# Patient Record
Sex: FEMALE | Race: WHITE | Employment: FULL TIME | URBAN - METROPOLITAN AREA
[De-identification: names, ages, dates, MRNs, and addresses within clinical notes are randomized per-mention and may not be internally consistent; named-entity substitution may affect disease eponyms.]

---

## 2018-03-14 ENCOUNTER — HOSPITAL ENCOUNTER (OUTPATIENT)
Dept: GENERAL RADIOLOGY | Age: 58
Discharge: HOME | End: 2018-03-14
Payer: COMMERCIAL

## 2018-03-14 ENCOUNTER — HOSPITAL ENCOUNTER (OUTPATIENT)
Dept: PREADMISSION TESTING | Age: 58
Discharge: HOME | End: 2018-03-14
Payer: COMMERCIAL

## 2018-03-14 DIAGNOSIS — M16.12 PRIMARY OSTEOARTHRITIS OF LEFT HIP: ICD-10-CM

## 2018-03-14 LAB
ABO + RH BLD: NORMAL
ALBUMIN SERPL-MCNC: 3.8 G/DL (ref 3.4–5)
ALBUMIN/GLOB SERPL: 1.1 {RATIO} (ref 0.8–1.7)
ALP SERPL-CCNC: 104 U/L (ref 45–117)
ALT SERPL-CCNC: 46 U/L (ref 13–56)
ANION GAP SERPL CALC-SCNC: 10 MMOL/L (ref 3–18)
APPEARANCE UR: CLEAR
APTT PPP: 27.6 SEC (ref 23–36.4)
AST SERPL-CCNC: 22 U/L (ref 15–37)
ATRIAL RATE: 81 BPM
BACTERIA SPEC CULT: NORMAL
BACTERIA URNS QL MICRO: ABNORMAL /HPF
BASOPHILS # BLD: 0 K/UL (ref 0–0.06)
BASOPHILS NFR BLD: 0 % (ref 0–2)
BILIRUB SERPL-MCNC: 0.5 MG/DL (ref 0.2–1)
BILIRUB UR QL: NEGATIVE
BLOOD GROUP ANTIBODIES SERPL: NORMAL
BUN SERPL-MCNC: 16 MG/DL (ref 7–18)
BUN/CREAT SERPL: 20 (ref 12–20)
CALCIUM SERPL-MCNC: 8.9 MG/DL (ref 8.5–10.1)
CALCULATED P AXIS, ECG09: 60 DEGREES
CALCULATED R AXIS, ECG10: 31 DEGREES
CALCULATED T AXIS, ECG11: 56 DEGREES
CHLORIDE SERPL-SCNC: 104 MMOL/L (ref 100–108)
CO2 SERPL-SCNC: 29 MMOL/L (ref 21–32)
COLOR UR: YELLOW
CREAT SERPL-MCNC: 0.79 MG/DL (ref 0.6–1.3)
DIAGNOSIS, 93000: NORMAL
DIFFERENTIAL METHOD BLD: NORMAL
EOSINOPHIL # BLD: 0.1 K/UL (ref 0–0.4)
EOSINOPHIL NFR BLD: 2 % (ref 0–5)
EPITH CASTS URNS QL MICRO: ABNORMAL /LPF (ref 0–5)
ERYTHROCYTE [DISTWIDTH] IN BLOOD BY AUTOMATED COUNT: 13.5 % (ref 11.6–14.5)
GLOBULIN SER CALC-MCNC: 3.4 G/DL (ref 2–4)
GLUCOSE SERPL-MCNC: 96 MG/DL (ref 74–99)
GLUCOSE UR STRIP.AUTO-MCNC: NEGATIVE MG/DL
HCT VFR BLD AUTO: 43.2 % (ref 35–45)
HGB BLD-MCNC: 14.2 G/DL (ref 12–16)
HGB UR QL STRIP: ABNORMAL
INR PPP: 0.9 (ref 0.8–1.2)
KETONES UR QL STRIP.AUTO: NEGATIVE MG/DL
LEUKOCYTE ESTERASE UR QL STRIP.AUTO: NEGATIVE
LYMPHOCYTES # BLD: 1.9 K/UL (ref 0.9–3.6)
LYMPHOCYTES NFR BLD: 31 % (ref 21–52)
MCH RBC QN AUTO: 29.5 PG (ref 24–34)
MCHC RBC AUTO-ENTMCNC: 32.9 G/DL (ref 31–37)
MCV RBC AUTO: 89.8 FL (ref 74–97)
MONOCYTES # BLD: 0.6 K/UL (ref 0.05–1.2)
MONOCYTES NFR BLD: 10 % (ref 3–10)
NEUTS SEG # BLD: 3.5 K/UL (ref 1.8–8)
NEUTS SEG NFR BLD: 57 % (ref 40–73)
NITRITE UR QL STRIP.AUTO: NEGATIVE
P-R INTERVAL, ECG05: 176 MS
PH UR STRIP: 7 [PH] (ref 5–8)
PLATELET # BLD AUTO: 219 K/UL (ref 135–420)
PMV BLD AUTO: 9.8 FL (ref 9.2–11.8)
POTASSIUM SERPL-SCNC: 3.6 MMOL/L (ref 3.5–5.5)
PROT SERPL-MCNC: 7.2 G/DL (ref 6.4–8.2)
PROT UR STRIP-MCNC: NEGATIVE MG/DL
PROTHROMBIN TIME: 11.9 SEC (ref 11.5–15.2)
Q-T INTERVAL, ECG07: 388 MS
QRS DURATION, ECG06: 94 MS
QTC CALCULATION (BEZET), ECG08: 450 MS
RBC # BLD AUTO: 4.81 M/UL (ref 4.2–5.3)
RBC #/AREA URNS HPF: ABNORMAL /HPF (ref 0–5)
SERVICE CMNT-IMP: NORMAL
SODIUM SERPL-SCNC: 143 MMOL/L (ref 136–145)
SP GR UR REFRACTOMETRY: 1.02 (ref 1–1.03)
SPECIMEN EXP DATE BLD: NORMAL
UROBILINOGEN UR QL STRIP.AUTO: 1 EU/DL (ref 0.2–1)
VENTRICULAR RATE, ECG03: 81 BPM
WBC # BLD AUTO: 6.1 K/UL (ref 4.6–13.2)
WBC URNS QL MICRO: ABNORMAL /HPF (ref 0–5)

## 2018-03-14 PROCEDURE — 85610 PROTHROMBIN TIME: CPT | Performed by: ORTHOPAEDIC SURGERY

## 2018-03-14 PROCEDURE — 80053 COMPREHEN METABOLIC PANEL: CPT | Performed by: ORTHOPAEDIC SURGERY

## 2018-03-14 PROCEDURE — 36415 COLL VENOUS BLD VENIPUNCTURE: CPT | Performed by: ORTHOPAEDIC SURGERY

## 2018-03-14 PROCEDURE — 85025 COMPLETE CBC W/AUTO DIFF WBC: CPT | Performed by: ORTHOPAEDIC SURGERY

## 2018-03-14 PROCEDURE — 86850 RBC ANTIBODY SCREEN: CPT | Performed by: ORTHOPAEDIC SURGERY

## 2018-03-14 PROCEDURE — 71045 X-RAY EXAM CHEST 1 VIEW: CPT

## 2018-03-14 PROCEDURE — 81001 URINALYSIS AUTO W/SCOPE: CPT | Performed by: ORTHOPAEDIC SURGERY

## 2018-03-14 PROCEDURE — 87641 MR-STAPH DNA AMP PROBE: CPT | Performed by: ORTHOPAEDIC SURGERY

## 2018-03-14 PROCEDURE — 85730 THROMBOPLASTIN TIME PARTIAL: CPT | Performed by: ORTHOPAEDIC SURGERY

## 2018-03-14 PROCEDURE — 83036 HEMOGLOBIN GLYCOSYLATED A1C: CPT | Performed by: ORTHOPAEDIC SURGERY

## 2018-03-14 PROCEDURE — 93005 ELECTROCARDIOGRAM TRACING: CPT

## 2018-03-20 ENCOUNTER — ANESTHESIA EVENT (OUTPATIENT)
Dept: SURGERY | Age: 58
DRG: 470 | End: 2018-03-20
Payer: COMMERCIAL

## 2018-03-21 ENCOUNTER — APPOINTMENT (OUTPATIENT)
Dept: GENERAL RADIOLOGY | Age: 58
DRG: 470 | End: 2018-03-21
Attending: ORTHOPAEDIC SURGERY
Payer: COMMERCIAL

## 2018-03-21 ENCOUNTER — HOSPITAL ENCOUNTER (INPATIENT)
Age: 58
LOS: 3 days | Discharge: HOME HEALTH | DRG: 470 | End: 2018-03-24
Attending: ORTHOPAEDIC SURGERY | Admitting: ORTHOPAEDIC SURGERY
Payer: COMMERCIAL

## 2018-03-21 ENCOUNTER — APPOINTMENT (OUTPATIENT)
Dept: GENERAL RADIOLOGY | Age: 58
DRG: 470 | End: 2018-03-21
Attending: PHYSICIAN ASSISTANT
Payer: COMMERCIAL

## 2018-03-21 ENCOUNTER — ANESTHESIA (OUTPATIENT)
Dept: SURGERY | Age: 58
DRG: 470 | End: 2018-03-21
Payer: COMMERCIAL

## 2018-03-21 DIAGNOSIS — Z96.642 STATUS POST TOTAL REPLACEMENT OF LEFT HIP: Primary | ICD-10-CM

## 2018-03-21 PROBLEM — Z96.649 S/P TOTAL HIP ARTHROPLASTY: Status: ACTIVE | Noted: 2018-03-21

## 2018-03-21 PROCEDURE — 77030037875 HC DRSG MEPILEX <16IN BORD MOLN -A: Performed by: ORTHOPAEDIC SURGERY

## 2018-03-21 PROCEDURE — 74011250636 HC RX REV CODE- 250/636: Performed by: PHYSICIAN ASSISTANT

## 2018-03-21 PROCEDURE — 97116 GAIT TRAINING THERAPY: CPT

## 2018-03-21 PROCEDURE — 77030012407 HC DRN WND BARD -B: Performed by: ORTHOPAEDIC SURGERY

## 2018-03-21 PROCEDURE — 77030018836 HC SOL IRR NACL ICUM -A: Performed by: ORTHOPAEDIC SURGERY

## 2018-03-21 PROCEDURE — 77030011640 HC PAD GRND REM COVD -A: Performed by: ORTHOPAEDIC SURGERY

## 2018-03-21 PROCEDURE — 77030020262 HC SOL INJ SOD CL 0.9% 100ML: Performed by: ORTHOPAEDIC SURGERY

## 2018-03-21 PROCEDURE — 77030012891

## 2018-03-21 PROCEDURE — 74011250636 HC RX REV CODE- 250/636: Performed by: ORTHOPAEDIC SURGERY

## 2018-03-21 PROCEDURE — 76060000035 HC ANESTHESIA 2 TO 2.5 HR: Performed by: ORTHOPAEDIC SURGERY

## 2018-03-21 PROCEDURE — 65270000029 HC RM PRIVATE

## 2018-03-21 PROCEDURE — 74011000258 HC RX REV CODE- 258: Performed by: ORTHOPAEDIC SURGERY

## 2018-03-21 PROCEDURE — 88305 TISSUE EXAM BY PATHOLOGIST: CPT | Performed by: ORTHOPAEDIC SURGERY

## 2018-03-21 PROCEDURE — 77030002912 HC SUT ETHBND J&J -A: Performed by: ORTHOPAEDIC SURGERY

## 2018-03-21 PROCEDURE — 77030022295 HC SEAL BPLR VSL DISP MEDT -F: Performed by: ORTHOPAEDIC SURGERY

## 2018-03-21 PROCEDURE — 74011000258 HC RX REV CODE- 258

## 2018-03-21 PROCEDURE — 77030012508 HC MSK AIRWY LMA AMBU -A: Performed by: ANESTHESIOLOGY

## 2018-03-21 PROCEDURE — 77030032490 HC SLV COMPR SCD KNE COVD -B: Performed by: ORTHOPAEDIC SURGERY

## 2018-03-21 PROCEDURE — 77030031139 HC SUT VCRL2 J&J -A: Performed by: ORTHOPAEDIC SURGERY

## 2018-03-21 PROCEDURE — 77010033678 HC OXYGEN DAILY

## 2018-03-21 PROCEDURE — 77030002916 HC SUT ETHLN J&J -A: Performed by: ORTHOPAEDIC SURGERY

## 2018-03-21 PROCEDURE — 74011000250 HC RX REV CODE- 250

## 2018-03-21 PROCEDURE — 77030002933 HC SUT MCRYL J&J -A: Performed by: ORTHOPAEDIC SURGERY

## 2018-03-21 PROCEDURE — 77030038010: Performed by: ORTHOPAEDIC SURGERY

## 2018-03-21 PROCEDURE — 74011250637 HC RX REV CODE- 250/637: Performed by: PHYSICIAN ASSISTANT

## 2018-03-21 PROCEDURE — 77030011256 HC DRSG MEPILEX <16IN NO BORD MOLN -A: Performed by: ORTHOPAEDIC SURGERY

## 2018-03-21 PROCEDURE — 77030013567 HC DRN WND RESERV BARD -A: Performed by: ORTHOPAEDIC SURGERY

## 2018-03-21 PROCEDURE — 74011000250 HC RX REV CODE- 250: Performed by: ORTHOPAEDIC SURGERY

## 2018-03-21 PROCEDURE — 77030012406 HC DRN WND PENRS BARD -A: Performed by: ORTHOPAEDIC SURGERY

## 2018-03-21 PROCEDURE — 74011250636 HC RX REV CODE- 250/636

## 2018-03-21 PROCEDURE — 77030027138 HC INCENT SPIROMETER -A: Performed by: ORTHOPAEDIC SURGERY

## 2018-03-21 PROCEDURE — 76010000131 HC OR TIME 2 TO 2.5 HR: Performed by: ORTHOPAEDIC SURGERY

## 2018-03-21 PROCEDURE — 77030020782 HC GWN BAIR PAWS FLX 3M -B: Performed by: ORTHOPAEDIC SURGERY

## 2018-03-21 PROCEDURE — 73501 X-RAY EXAM HIP UNI 1 VIEW: CPT

## 2018-03-21 PROCEDURE — C9290 INJ, BUPIVACAINE LIPOSOME: HCPCS | Performed by: ORTHOPAEDIC SURGERY

## 2018-03-21 PROCEDURE — 74011250636 HC RX REV CODE- 250/636: Performed by: ANESTHESIOLOGY

## 2018-03-21 PROCEDURE — 0SRB04A REPLACEMENT OF LEFT HIP JOINT WITH CERAMIC ON POLYETHYLENE SYNTHETIC SUBSTITUTE, UNCEMENTED, OPEN APPROACH: ICD-10-PCS | Performed by: ORTHOPAEDIC SURGERY

## 2018-03-21 PROCEDURE — 97161 PT EVAL LOW COMPLEX 20 MIN: CPT

## 2018-03-21 PROCEDURE — C1776 JOINT DEVICE (IMPLANTABLE): HCPCS | Performed by: ORTHOPAEDIC SURGERY

## 2018-03-21 PROCEDURE — 74011250637 HC RX REV CODE- 250/637: Performed by: ANESTHESIOLOGY

## 2018-03-21 PROCEDURE — 76210000017 HC OR PH I REC 1.5 TO 2 HR: Performed by: ORTHOPAEDIC SURGERY

## 2018-03-21 DEVICE — COMPONENT TOT HIP PRIMARY CERM ALTRX: Type: IMPLANTABLE DEVICE | Site: HIP | Status: FUNCTIONAL

## 2018-03-21 DEVICE — STEM FEM SZ 12 L130MM NK L38.5MM 135DEG 41MM OFFSET STD HIP: Type: IMPLANTABLE DEVICE | Site: HIP | Status: FUNCTIONAL

## 2018-03-21 DEVICE — APEX HOLE ELIMINATOR - PS
Type: IMPLANTABLE DEVICE | Site: HIP | Status: FUNCTIONAL
Brand: APEX

## 2018-03-21 DEVICE — HEAD FEM DIA36MM -2MM OFFSET 12/14 TAPR ARTC EZ HIP MTL: Type: IMPLANTABLE DEVICE | Site: HIP | Status: FUNCTIONAL

## 2018-03-21 DEVICE — SCREW BNE L25MM DIA6.5MM CANC HIP S STL GRIPTION FULL THRD: Type: IMPLANTABLE DEVICE | Site: HIP | Status: FUNCTIONAL

## 2018-03-21 DEVICE — PINNACLE HIP SOLUTIONS ALTRX POLYETHYLENE ACETABULAR LINER NEUTRAL 36MM ID 52MM OD
Type: IMPLANTABLE DEVICE | Site: HIP | Status: FUNCTIONAL
Brand: PINNACLE ALTRX

## 2018-03-21 DEVICE — PINNACLE GRIPTION ACETABULAR SHELL SECTOR 52MM OD
Type: IMPLANTABLE DEVICE | Site: HIP | Status: FUNCTIONAL
Brand: PINNACLE GRIPTION

## 2018-03-21 RX ORDER — SODIUM CHLORIDE 0.9 % (FLUSH) 0.9 %
5-10 SYRINGE (ML) INJECTION AS NEEDED
Status: DISCONTINUED | OUTPATIENT
Start: 2018-03-21 | End: 2018-03-21 | Stop reason: HOSPADM

## 2018-03-21 RX ORDER — SODIUM CHLORIDE 0.9 % (FLUSH) 0.9 %
5-10 SYRINGE (ML) INJECTION EVERY 8 HOURS
Status: CANCELLED | OUTPATIENT
Start: 2018-03-21

## 2018-03-21 RX ORDER — LOSARTAN POTASSIUM 50 MG/1
50 TABLET ORAL
Status: DISCONTINUED | OUTPATIENT
Start: 2018-03-21 | End: 2018-03-24 | Stop reason: HOSPADM

## 2018-03-21 RX ORDER — OXYCODONE HYDROCHLORIDE 5 MG/1
10 TABLET ORAL
Status: DISCONTINUED | OUTPATIENT
Start: 2018-03-21 | End: 2018-03-24 | Stop reason: HOSPADM

## 2018-03-21 RX ORDER — METOPROLOL SUCCINATE 50 MG/1
50 TABLET, EXTENDED RELEASE ORAL DAILY
Status: DISCONTINUED | OUTPATIENT
Start: 2018-03-22 | End: 2018-03-24 | Stop reason: HOSPADM

## 2018-03-21 RX ORDER — BUPIVACAINE HYDROCHLORIDE 2.5 MG/ML
INJECTION, SOLUTION EPIDURAL; INFILTRATION; INTRACAUDAL AS NEEDED
Status: DISCONTINUED | OUTPATIENT
Start: 2018-03-21 | End: 2018-03-21 | Stop reason: HOSPADM

## 2018-03-21 RX ORDER — KETOROLAC TROMETHAMINE 15 MG/ML
15 INJECTION, SOLUTION INTRAMUSCULAR; INTRAVENOUS EVERY 6 HOURS
Status: COMPLETED | OUTPATIENT
Start: 2018-03-21 | End: 2018-03-22

## 2018-03-21 RX ORDER — DEXMEDETOMIDINE HYDROCHLORIDE 4 UG/ML
INJECTION, SOLUTION INTRAVENOUS AS NEEDED
Status: DISCONTINUED | OUTPATIENT
Start: 2018-03-21 | End: 2018-03-21 | Stop reason: HOSPADM

## 2018-03-21 RX ORDER — DEXTROSE 50 % IN WATER (D50W) INTRAVENOUS SYRINGE
25-50 AS NEEDED
Status: DISCONTINUED | OUTPATIENT
Start: 2018-03-21 | End: 2018-03-21 | Stop reason: HOSPADM

## 2018-03-21 RX ORDER — OXYCODONE HYDROCHLORIDE 5 MG/1
5 TABLET ORAL
Status: DISCONTINUED | OUTPATIENT
Start: 2018-03-21 | End: 2018-03-24 | Stop reason: HOSPADM

## 2018-03-21 RX ORDER — HYDROMORPHONE HYDROCHLORIDE 2 MG/ML
0.5 INJECTION, SOLUTION INTRAMUSCULAR; INTRAVENOUS; SUBCUTANEOUS
Status: DISCONTINUED | OUTPATIENT
Start: 2018-03-21 | End: 2018-03-24 | Stop reason: HOSPADM

## 2018-03-21 RX ORDER — DIPHENHYDRAMINE HCL 25 MG
25 CAPSULE ORAL
Status: DISCONTINUED | OUTPATIENT
Start: 2018-03-21 | End: 2018-03-24 | Stop reason: HOSPADM

## 2018-03-21 RX ORDER — LORAZEPAM 0.5 MG/1
0.5 TABLET ORAL
Status: DISCONTINUED | OUTPATIENT
Start: 2018-03-21 | End: 2018-03-24 | Stop reason: HOSPADM

## 2018-03-21 RX ORDER — PREGABALIN 25 MG/1
25 CAPSULE ORAL ONCE
Status: COMPLETED | OUTPATIENT
Start: 2018-03-21 | End: 2018-03-21

## 2018-03-21 RX ORDER — HYDROMORPHONE HYDROCHLORIDE 2 MG/ML
INJECTION, SOLUTION INTRAMUSCULAR; INTRAVENOUS; SUBCUTANEOUS AS NEEDED
Status: DISCONTINUED | OUTPATIENT
Start: 2018-03-21 | End: 2018-03-21 | Stop reason: HOSPADM

## 2018-03-21 RX ORDER — LIDOCAINE HYDROCHLORIDE 20 MG/ML
INJECTION, SOLUTION EPIDURAL; INFILTRATION; INTRACAUDAL; PERINEURAL AS NEEDED
Status: DISCONTINUED | OUTPATIENT
Start: 2018-03-21 | End: 2018-03-21 | Stop reason: HOSPADM

## 2018-03-21 RX ORDER — CELECOXIB 100 MG/1
200 CAPSULE ORAL ONCE
Status: COMPLETED | OUTPATIENT
Start: 2018-03-21 | End: 2018-03-21

## 2018-03-21 RX ORDER — ONDANSETRON 2 MG/ML
INJECTION INTRAMUSCULAR; INTRAVENOUS AS NEEDED
Status: DISCONTINUED | OUTPATIENT
Start: 2018-03-21 | End: 2018-03-21 | Stop reason: HOSPADM

## 2018-03-21 RX ORDER — DEXAMETHASONE SODIUM PHOSPHATE 4 MG/ML
INJECTION, SOLUTION INTRA-ARTICULAR; INTRALESIONAL; INTRAMUSCULAR; INTRAVENOUS; SOFT TISSUE AS NEEDED
Status: DISCONTINUED | OUTPATIENT
Start: 2018-03-21 | End: 2018-03-21 | Stop reason: HOSPADM

## 2018-03-21 RX ORDER — SODIUM CHLORIDE, SODIUM LACTATE, POTASSIUM CHLORIDE, CALCIUM CHLORIDE 600; 310; 30; 20 MG/100ML; MG/100ML; MG/100ML; MG/100ML
125 INJECTION, SOLUTION INTRAVENOUS CONTINUOUS
Status: DISPENSED | OUTPATIENT
Start: 2018-03-21 | End: 2018-03-22

## 2018-03-21 RX ORDER — SODIUM CHLORIDE, SODIUM LACTATE, POTASSIUM CHLORIDE, CALCIUM CHLORIDE 600; 310; 30; 20 MG/100ML; MG/100ML; MG/100ML; MG/100ML
125 INJECTION, SOLUTION INTRAVENOUS CONTINUOUS
Status: DISCONTINUED | OUTPATIENT
Start: 2018-03-21 | End: 2018-03-21

## 2018-03-21 RX ORDER — SCOLOPAMINE TRANSDERMAL SYSTEM 1 MG/1
1 PATCH, EXTENDED RELEASE TRANSDERMAL ONCE
Status: COMPLETED | OUTPATIENT
Start: 2018-03-21 | End: 2018-03-24

## 2018-03-21 RX ORDER — KETOROLAC TROMETHAMINE 30 MG/ML
INJECTION, SOLUTION INTRAMUSCULAR; INTRAVENOUS AS NEEDED
Status: DISCONTINUED | OUTPATIENT
Start: 2018-03-21 | End: 2018-03-21 | Stop reason: HOSPADM

## 2018-03-21 RX ORDER — CEFAZOLIN SODIUM 2 G/50ML
2 SOLUTION INTRAVENOUS ONCE
Status: COMPLETED | OUTPATIENT
Start: 2018-03-21 | End: 2018-03-21

## 2018-03-21 RX ORDER — CEFAZOLIN SODIUM 2 G/50ML
2 SOLUTION INTRAVENOUS EVERY 8 HOURS
Status: COMPLETED | OUTPATIENT
Start: 2018-03-21 | End: 2018-03-23

## 2018-03-21 RX ORDER — ACETAMINOPHEN 500 MG
1000 TABLET ORAL EVERY 8 HOURS
Status: DISCONTINUED | OUTPATIENT
Start: 2018-03-21 | End: 2018-03-24 | Stop reason: HOSPADM

## 2018-03-21 RX ORDER — NALOXONE HYDROCHLORIDE 0.4 MG/ML
0.1 INJECTION, SOLUTION INTRAMUSCULAR; INTRAVENOUS; SUBCUTANEOUS AS NEEDED
Status: DISCONTINUED | OUTPATIENT
Start: 2018-03-21 | End: 2018-03-21 | Stop reason: HOSPADM

## 2018-03-21 RX ORDER — ACETAMINOPHEN 10 MG/ML
1000 INJECTION, SOLUTION INTRAVENOUS ONCE
Status: COMPLETED | OUTPATIENT
Start: 2018-03-21 | End: 2018-03-21

## 2018-03-21 RX ORDER — MAGNESIUM SULFATE 100 %
4 CRYSTALS MISCELLANEOUS AS NEEDED
Status: DISCONTINUED | OUTPATIENT
Start: 2018-03-21 | End: 2018-03-21 | Stop reason: HOSPADM

## 2018-03-21 RX ORDER — LANOLIN ALCOHOL/MO/W.PET/CERES
1 CREAM (GRAM) TOPICAL
Status: DISCONTINUED | OUTPATIENT
Start: 2018-03-22 | End: 2018-03-24 | Stop reason: HOSPADM

## 2018-03-21 RX ORDER — MIDAZOLAM HYDROCHLORIDE 1 MG/ML
INJECTION, SOLUTION INTRAMUSCULAR; INTRAVENOUS AS NEEDED
Status: DISCONTINUED | OUTPATIENT
Start: 2018-03-21 | End: 2018-03-21 | Stop reason: HOSPADM

## 2018-03-21 RX ORDER — ATORVASTATIN CALCIUM 10 MG/1
10 TABLET, FILM COATED ORAL
Status: DISCONTINUED | OUTPATIENT
Start: 2018-03-21 | End: 2018-03-24 | Stop reason: HOSPADM

## 2018-03-21 RX ORDER — SODIUM CHLORIDE 0.9 % (FLUSH) 0.9 %
5-10 SYRINGE (ML) INJECTION EVERY 8 HOURS
Status: DISCONTINUED | OUTPATIENT
Start: 2018-03-21 | End: 2018-03-24 | Stop reason: HOSPADM

## 2018-03-21 RX ORDER — GLYCOPYRROLATE 0.2 MG/ML
INJECTION INTRAMUSCULAR; INTRAVENOUS AS NEEDED
Status: DISCONTINUED | OUTPATIENT
Start: 2018-03-21 | End: 2018-03-21 | Stop reason: HOSPADM

## 2018-03-21 RX ORDER — EPHEDRINE SULFATE/0.9% NACL/PF 25 MG/5 ML
SYRINGE (ML) INTRAVENOUS AS NEEDED
Status: DISCONTINUED | OUTPATIENT
Start: 2018-03-21 | End: 2018-03-21 | Stop reason: HOSPADM

## 2018-03-21 RX ORDER — FENTANYL CITRATE 50 UG/ML
50 INJECTION, SOLUTION INTRAMUSCULAR; INTRAVENOUS
Status: DISCONTINUED | OUTPATIENT
Start: 2018-03-21 | End: 2018-03-21 | Stop reason: HOSPADM

## 2018-03-21 RX ORDER — SODIUM CHLORIDE 0.9 % (FLUSH) 0.9 %
5-10 SYRINGE (ML) INJECTION AS NEEDED
Status: CANCELLED | OUTPATIENT
Start: 2018-03-21

## 2018-03-21 RX ORDER — ONDANSETRON 2 MG/ML
4 INJECTION INTRAMUSCULAR; INTRAVENOUS
Status: DISCONTINUED | OUTPATIENT
Start: 2018-03-21 | End: 2018-03-24 | Stop reason: HOSPADM

## 2018-03-21 RX ORDER — DIPHENHYDRAMINE HYDROCHLORIDE 50 MG/ML
12.5 INJECTION, SOLUTION INTRAMUSCULAR; INTRAVENOUS
Status: DISCONTINUED | OUTPATIENT
Start: 2018-03-21 | End: 2018-03-24 | Stop reason: HOSPADM

## 2018-03-21 RX ORDER — GUAIFENESIN 100 MG/5ML
81 LIQUID (ML) ORAL 2 TIMES DAILY
Status: DISCONTINUED | OUTPATIENT
Start: 2018-03-21 | End: 2018-03-24 | Stop reason: HOSPADM

## 2018-03-21 RX ORDER — ZOLPIDEM TARTRATE 5 MG/1
5 TABLET ORAL
Status: DISCONTINUED | OUTPATIENT
Start: 2018-03-21 | End: 2018-03-24 | Stop reason: HOSPADM

## 2018-03-21 RX ORDER — FLUMAZENIL 0.1 MG/ML
0.2 INJECTION INTRAVENOUS
Status: DISCONTINUED | OUTPATIENT
Start: 2018-03-21 | End: 2018-03-21 | Stop reason: HOSPADM

## 2018-03-21 RX ORDER — DOCUSATE SODIUM 100 MG/1
100 CAPSULE, LIQUID FILLED ORAL 3 TIMES DAILY
Status: DISCONTINUED | OUTPATIENT
Start: 2018-03-21 | End: 2018-03-24 | Stop reason: HOSPADM

## 2018-03-21 RX ORDER — NALOXONE HYDROCHLORIDE 0.4 MG/ML
0.4 INJECTION, SOLUTION INTRAMUSCULAR; INTRAVENOUS; SUBCUTANEOUS AS NEEDED
Status: DISCONTINUED | OUTPATIENT
Start: 2018-03-21 | End: 2018-03-24 | Stop reason: HOSPADM

## 2018-03-21 RX ORDER — SODIUM CHLORIDE 0.9 % (FLUSH) 0.9 %
5-10 SYRINGE (ML) INJECTION AS NEEDED
Status: DISCONTINUED | OUTPATIENT
Start: 2018-03-21 | End: 2018-03-24 | Stop reason: HOSPADM

## 2018-03-21 RX ORDER — INSULIN LISPRO 100 [IU]/ML
INJECTION, SOLUTION INTRAVENOUS; SUBCUTANEOUS ONCE
Status: DISCONTINUED | OUTPATIENT
Start: 2018-03-21 | End: 2018-03-21 | Stop reason: HOSPADM

## 2018-03-21 RX ORDER — FENTANYL CITRATE 50 UG/ML
INJECTION, SOLUTION INTRAMUSCULAR; INTRAVENOUS AS NEEDED
Status: DISCONTINUED | OUTPATIENT
Start: 2018-03-21 | End: 2018-03-21 | Stop reason: HOSPADM

## 2018-03-21 RX ORDER — PROPOFOL 10 MG/ML
INJECTION, EMULSION INTRAVENOUS AS NEEDED
Status: DISCONTINUED | OUTPATIENT
Start: 2018-03-21 | End: 2018-03-21 | Stop reason: HOSPADM

## 2018-03-21 RX ORDER — SODIUM CHLORIDE, SODIUM LACTATE, POTASSIUM CHLORIDE, CALCIUM CHLORIDE 600; 310; 30; 20 MG/100ML; MG/100ML; MG/100ML; MG/100ML
1000 INJECTION, SOLUTION INTRAVENOUS CONTINUOUS
Status: DISCONTINUED | OUTPATIENT
Start: 2018-03-21 | End: 2018-03-21 | Stop reason: HOSPADM

## 2018-03-21 RX ADMIN — TRANEXAMIC ACID 1 G: 100 INJECTION, SOLUTION INTRAVENOUS at 12:13

## 2018-03-21 RX ADMIN — SODIUM CHLORIDE, SODIUM LACTATE, POTASSIUM CHLORIDE, AND CALCIUM CHLORIDE 1000 ML: 600; 310; 30; 20 INJECTION, SOLUTION INTRAVENOUS at 23:46

## 2018-03-21 RX ADMIN — SODIUM CHLORIDE, SODIUM LACTATE, POTASSIUM CHLORIDE, AND CALCIUM CHLORIDE 125 ML/HR: 600; 310; 30; 20 INJECTION, SOLUTION INTRAVENOUS at 09:09

## 2018-03-21 RX ADMIN — FENTANYL CITRATE 100 MCG: 50 INJECTION, SOLUTION INTRAMUSCULAR; INTRAVENOUS at 10:44

## 2018-03-21 RX ADMIN — ACETAMINOPHEN 1000 MG: 10 INJECTION, SOLUTION INTRAVENOUS at 10:35

## 2018-03-21 RX ADMIN — CELECOXIB 200 MG: 100 CAPSULE ORAL at 09:38

## 2018-03-21 RX ADMIN — MIDAZOLAM HYDROCHLORIDE 2 MG: 1 INJECTION, SOLUTION INTRAMUSCULAR; INTRAVENOUS at 10:35

## 2018-03-21 RX ADMIN — DEXAMETHASONE SODIUM PHOSPHATE 4 MG: 4 INJECTION, SOLUTION INTRA-ARTICULAR; INTRALESIONAL; INTRAMUSCULAR; INTRAVENOUS; SOFT TISSUE at 10:46

## 2018-03-21 RX ADMIN — SODIUM CHLORIDE, SODIUM LACTATE, POTASSIUM CHLORIDE, AND CALCIUM CHLORIDE: 600; 310; 30; 20 INJECTION, SOLUTION INTRAVENOUS at 11:46

## 2018-03-21 RX ADMIN — ACETAMINOPHEN 1000 MG: 500 TABLET ORAL at 22:09

## 2018-03-21 RX ADMIN — HYDROMORPHONE HYDROCHLORIDE 1 MG: 2 INJECTION, SOLUTION INTRAMUSCULAR; INTRAVENOUS; SUBCUTANEOUS at 11:38

## 2018-03-21 RX ADMIN — ASPIRIN 81 MG 81 MG: 81 TABLET ORAL at 22:09

## 2018-03-21 RX ADMIN — KETOROLAC TROMETHAMINE 15 MG: 15 INJECTION, SOLUTION INTRAMUSCULAR; INTRAVENOUS at 17:37

## 2018-03-21 RX ADMIN — HYDROMORPHONE HYDROCHLORIDE 1 MG: 2 INJECTION, SOLUTION INTRAMUSCULAR; INTRAVENOUS; SUBCUTANEOUS at 10:44

## 2018-03-21 RX ADMIN — ONDANSETRON 4 MG: 2 INJECTION INTRAMUSCULAR; INTRAVENOUS at 10:46

## 2018-03-21 RX ADMIN — SODIUM CHLORIDE, SODIUM LACTATE, POTASSIUM CHLORIDE, AND CALCIUM CHLORIDE: 600; 310; 30; 20 INJECTION, SOLUTION INTRAVENOUS at 10:51

## 2018-03-21 RX ADMIN — PROPOFOL 200 MG: 10 INJECTION, EMULSION INTRAVENOUS at 10:44

## 2018-03-21 RX ADMIN — LIDOCAINE HYDROCHLORIDE 80 MG: 20 INJECTION, SOLUTION EPIDURAL; INFILTRATION; INTRACAUDAL; PERINEURAL at 10:44

## 2018-03-21 RX ADMIN — DOCUSATE SODIUM 100 MG: 100 CAPSULE, LIQUID FILLED ORAL at 17:37

## 2018-03-21 RX ADMIN — Medication 10 MG: at 11:12

## 2018-03-21 RX ADMIN — TRANEXAMIC ACID 1 G: 100 INJECTION, SOLUTION INTRAVENOUS at 10:48

## 2018-03-21 RX ADMIN — DOCUSATE SODIUM 100 MG: 100 CAPSULE, LIQUID FILLED ORAL at 22:09

## 2018-03-21 RX ADMIN — SODIUM CHLORIDE, SODIUM LACTATE, POTASSIUM CHLORIDE, AND CALCIUM CHLORIDE 125 ML/HR: 600; 310; 30; 20 INJECTION, SOLUTION INTRAVENOUS at 13:35

## 2018-03-21 RX ADMIN — ACETAMINOPHEN 1000 MG: 500 TABLET ORAL at 17:37

## 2018-03-21 RX ADMIN — CEFAZOLIN SODIUM 2 G: 2 SOLUTION INTRAVENOUS at 18:58

## 2018-03-21 RX ADMIN — DEXMEDETOMIDINE HYDROCHLORIDE 16 MCG: 4 INJECTION, SOLUTION INTRAVENOUS at 10:46

## 2018-03-21 RX ADMIN — GLYCOPYRROLATE 0.2 MG: 0.2 INJECTION INTRAMUSCULAR; INTRAVENOUS at 10:35

## 2018-03-21 RX ADMIN — PREGABALIN 25 MG: 25 CAPSULE ORAL at 09:38

## 2018-03-21 RX ADMIN — CEFAZOLIN SODIUM 2 G: 2 SOLUTION INTRAVENOUS at 10:38

## 2018-03-21 RX ADMIN — KETOROLAC TROMETHAMINE 30 MG: 30 INJECTION, SOLUTION INTRAMUSCULAR; INTRAVENOUS at 12:15

## 2018-03-21 RX ADMIN — ATORVASTATIN CALCIUM 10 MG: 10 TABLET, FILM COATED ORAL at 22:09

## 2018-03-21 NOTE — IP AVS SNAPSHOT
[address] [address] 
[phone] Patient: Rohan Dumont MRN: [id number] ZBF:11/73/2794 A check grzegorz indicates which time of day the medication should be taken. My Medications START taking these medications Instructions Each Dose to Equal  
 Morning Noon Evening Bedtime  
 aspirin 81 mg chewable tablet Replaces:  aspirin delayed-release 81 mg tablet Your last dose was: Your next dose is: Take 1 Tab by mouth two (2) times a day. 81 mg  
    
   
   
   
  
 docusate sodium 50 mg capsule Commonly known as:  Nik Patterson Your last dose was: Your next dose is: Take 2 Caps by mouth three (3) times daily as needed for Constipation for up to 90 days. 100 mg  
    
   
   
   
  
 oxyCODONE-acetaminophen 5-325 mg per tablet Commonly known as:  PERCOCET Your last dose was: Your next dose is:    
   
   
 1-2 tabs by mouth every 4-6 hours as needed for pain STOP taking these medications   
 aspirin delayed-release 81 mg tablet Replaced by:  aspirin 81 mg chewable tablet ASK your doctor about these medications Instructions Each Dose to Equal  
 Morning Noon Evening Bedtime  
 atorvastatin 10 mg tablet Commonly known as:  LIPITOR Your last dose was: Your next dose is: Take 10 mg by mouth nightly. 10 mg CALCIUM 600 WITH VITAMIN D3 600 mg(1,500mg) -400 unit Cap Generic drug:  Calcium-Cholecalciferol (D3) Your last dose was: Your next dose is: Take  by mouth. COQ10  PO Your last dose was: Your next dose is: Take  by mouth daily. losartan 50 mg tablet Commonly known as:  COZAAR Your last dose was: Your next dose is: Take 50 mg by mouth nightly. 50 mg  
    
   
   
   
  
 metoprolol succinate 50 mg XL tablet Commonly known as:  TOPROL-XL Your last dose was: Your next dose is: Take 50 mg by mouth daily. 50 mg  
    
   
   
   
  
 multivitamin tablet Commonly known as:  ONE A DAY Your last dose was: Your next dose is: Take 1 Tab by mouth daily. 1 Tab Where to Get Your Medications Information on where to get these meds will be given to you by the nurse or doctor. ! Ask your nurse or doctor about these medications  
  aspirin 81 mg chewable tablet  
 docusate sodium 50 mg capsule  
 oxyCODONE-acetaminophen 5-325 mg per tablet

## 2018-03-21 NOTE — H&P
Patient seen and examined. History and Physical Exam was reviewed.  No changes to History and Physical Exam.    Ne Grullon MD

## 2018-03-21 NOTE — ANESTHESIA POSTPROCEDURE EVALUATION
Post-Anesthesia Evaluation & Assessment    Visit Vitals    /57    Pulse 80    Temp 36.8 °C (98.2 °F)    Resp 13    Ht 5' 6\" (1.676 m)    Wt 92.6 kg (204 lb 3.2 oz)    SpO2 100%    BMI 32.96 kg/m2       No untreated/active PONV    Post-operative hydration adequate. Adequate post-operative analgesia per PACU discharge criteria    Mental status & level of consciousness: alert and oriented x 3    Respiratory status: patent unassisted airway     No apparent anesthetic complications requiring additional post-anesthetic care    Patient has met all discharge requirements.             Yahaira Kauffman MD

## 2018-03-21 NOTE — OP NOTES
[address] [place] [address], [address] [place] [address] REPLACEMENT      Patient: Leslie Soto MRN: [id number]  SSN: [id number]    YOB: 1960  Age: 59 y.o. Sex: female      Date of Surgery: 3/21/2018  Incision Time: 2842  Antibiotic Infusion Time: 6870  Preoperative Diagnosis: LEFT HIP OSTEOARTHRITIS   Postoperative Diagnosis: LEFT HIP OSTEOARTHRITIS   Location: [facility]  Surgeon: Jess Crockett MD  Assistant: Circ-1: Chrissy Garcia RN  Circ-Relief: Nelly Reyes RN  Physician Assistant: Laura Vazquez PA-C  Radiology Technician: Spenser Dubose RN-1: Leila Mitchell RN  Surg Asst-1: Jacqualin Soulier, was medically necessary for holding of retractors for exposure and to complete the case. Anesthesia: general    Procedure: Total Left Hip Arthroplasty  (CPT: 20592)    Findings: Degenerative joint disease of the left hip. Estimated Blood Loss: 600 mL    Specimens: None    Complications: None    Implants:   Implant Name Type Inv.  Item Serial No.  Lot No. LRB No. Used Action   CUP ACET SECTOR GRIPTION 52MM -- TI - [id number]  CUP ACET SECTOR GRIPTION 52MM -- TI  [facility] [employer] [id number] Left 1 Implanted   LINER ACET PINN NEUT [id number] -- ALTRX - [id number]  LINER ACET PINN NEUT [id number] -- ALTRX  [facility] [employer] [id number] Left 1 Implanted   PLUG ACET APCL H ELIM POS STP --  - [id number]  PLUG ACET APCL H ELIM POS STP --   [facility] [employer] [id number] Left 1 Implanted   SCR ACET CANC PINN 6.5X25MM SS --  - [id number]  SCR ACET CANC PINN 6.5X25MM SS --   [facility] [employer] [id number] Left 1 Implanted   SCR ACET CANC PINN 6.5X25MM SS --  - [id number]  SCR ACET CANC PINN 6.5X25MM SS --   [facility] [employer] [id number] Left 1 Implanted   HEAD FEM 36MM 2MM NK --  - [id number]  HEAD FEM 36MM 2MM NK --   [facility] [employer] [id number] Left 1 Implanted   STEM FEM CORAIL STD COL SZ 12 --  - [id number]   STEM FEM CORAIL STD COL SZ 12 -- [facility] [id number] Left 1 Implanted       Procedure Detail:  After the patient was brought to the operating suite, She was effectively anesthetized using general anesthesia, then transferred to the Burgaw table and secured in a standard fashion. Her left hip was then prepped and draped in a normal sterile orthopedic fashion. She was given appropriate intravenous antibiotics preoperatively. After a proper timeout was performed, a direct anterior approach to the hip was performed using a short Hurley-Donaldson interval. Anterior capsulotomy was performed. The degenerative changes of the hip were noted. Femoral neck osteotomy was then performed to the templated area. The head and neck were removed. The pulvinar and labrum were excised. The acetabulum was then reamed up to 51 mm with good bleeding cancellous bone obtained. The cup was then irrigated. A 52 mm Gription cup was then impacted in place with excellent stable fixation obtained at the rim - there was a little radiolucency medially due to slight over-reaming of the lower size reamers, placing the cup at about 45 degrees of abduction, 20 degrees of anteversion. 2 screws were placed superiorly. The liner was then impacted in place. Attention was turned to the femur, which was delivered into the wound with a combination of extension, external rotation, and adduction, and using the hook on the Burgaw table to deliver the femur into the wound. The canal was broached up to a size 12 for the Corail stem system with excellent stable fixation obtained. A trial reduction was then performed with the standard neck offset and 36 mm head balls with various neck lengths. With the -2, she appeared to have equalization of leg lengths and restoration of offset radiographically, and excellent functional stability was noted. The trial broach was removed. The canal was irrigated.  The final components were impacted in place with excellent stable fixation obtained once again. The final reduction was performed and once again leg lengths and offset were restored radiographically, using the C-arm radiographically intraoperatively, and excellent functional stability was noted. 30 cc of 0.25% marcaine and 20 cc of exparel diluted with 40 cc of NS were seperately injected into the soft tissues. The wound was then irrigated one more time, and then closed in layers. The capsule was closed with 1 Ethibond. A subfascial hemovac drain was placed. The fascia of the tensor was closed with #1 vicryl in a running type stitch. A ISABEL drain was placed into the subcutaneous layer. Subcutaneous tissue was closed with 0 vicryl then the subcuticular layer closed with 3-0 Vicryl in a simple buried stitch, and the skin was closed with running subcuticular 3-0 Monocryl. Steri-strips were applied followed by a dry, sterile dressing. She tolerated this well, was transferred to the bed, and taken to recovery room, extubated, in stable condition. All sponge and needle counts were correct.     Signed By: Theresa Marina MD     March 21, 2018

## 2018-03-21 NOTE — PERIOP NOTES
Transported patient to room. Patient is alert and oriented with no acute distress noted. Vital signs within normal limits. Dressing clean dry and intact. Dual skin assessment performed with Felix Shepherd RN.  No further questions from receiving nurse

## 2018-03-21 NOTE — IP AVS SNAPSHOT
[address] [address] 
[phone] Patient: Gianna Hudson MRN: [id number] RRY:72/67/8695 About your hospitalization You were admitted on:  March 21, 2018 You last received care in the:  THE [facility] [address] You were discharged on:  March 24, 2018 Why you were hospitalized Your primary diagnosis was:  Not on File Your diagnoses also included:  S/P Total Hip Arthroplasty Follow-up Information Follow up With Details Comments Contact Info Robert Gomez MD On 4/5/2018 Follow up appointment @ 9:15am [address] [facility] [address] [facility] 
[phone] [address] [facility] to continue managing your healthcare needs. [phone] Sakina Sewell [address] [facility] [address] [facility] 
[phone] Discharge Orders None A check grzegorz indicates which time of day the medication should be taken. My Medications START taking these medications Instructions Each Dose to Equal  
 Morning Noon Evening Bedtime  
 aspirin 81 mg chewable tablet Replaces:  aspirin delayed-release 81 mg tablet Your last dose was: Your next dose is: Take 1 Tab by mouth two (2) times a day. 81 mg  
    
   
   
   
  
 docusate sodium 50 mg capsule Commonly known as:  Demotte Heritage Your last dose was: Your next dose is: Take 2 Caps by mouth three (3) times daily as needed for Constipation for up to 90 days. 100 mg  
    
   
   
   
  
 oxyCODONE-acetaminophen 5-325 mg per tablet Commonly known as:  PERCOCET Your last dose was: Your next dose is:    
   
   
 1-2 tabs by mouth every 4-6 hours as needed for pain STOP taking these medications   
 aspirin delayed-release 81 mg tablet Replaced by:  aspirin 81 mg chewable tablet ASK your doctor about these medications Instructions Each Dose to Equal  
 Morning Noon Evening Bedtime  
 atorvastatin 10 mg tablet Commonly known as:  LIPITOR Your last dose was: Your next dose is: Take 10 mg by mouth nightly. 10 mg CALCIUM 600 WITH VITAMIN D3 600 mg(1,500mg) -400 unit Cap Generic drug:  Calcium-Cholecalciferol (D3) Your last dose was: Your next dose is: Take  by mouth. COQ10  PO Your last dose was: Your next dose is: Take  by mouth daily. losartan 50 mg tablet Commonly known as:  COZAAR Your last dose was: Your next dose is: Take 50 mg by mouth nightly. 50 mg  
    
   
   
   
  
 metoprolol succinate 50 mg XL tablet Commonly known as:  TOPROL-XL Your last dose was: Your next dose is: Take 50 mg by mouth daily. 50 mg  
    
   
   
   
  
 multivitamin tablet Commonly known as:  ONE A DAY Your last dose was: Your next dose is: Take 1 Tab by mouth daily. 1 Tab Where to Get Your Medications Information on where to get these meds will be given to you by the nurse or doctor. ! Ask your nurse or doctor about these medications  
  aspirin 81 mg chewable tablet  
 docusate sodium 50 mg capsule  
 oxyCODONE-acetaminophen 5-325 mg per tablet [facility] Patient Discharge Instructions Iglesia Paz / [id number] : 1960 Admitted 3/21/2018 Discharged: 3/24/2018 IF YOU HAVE ANY PROBLEMS ONCE YOU ARE AT HOME CALL THE FOLLOWING NUMBERS:  
Main office number: [phone] Your follow up appointment to see Dr. Margi Urbina is scheduled in 1-2 weeks. If you are unsure of your appointment date call the office at [phone]. Take Home Medications · Resume your home medictions as directed · A prescription for pain medication has been given · It is important that you take the medication exactly as they are prescribed. · Keep your medication in the bottles provided by the pharmacist and keep a list of the medication names, dosages, and times to be taken in your wallet. · Do not take other medications without consulting your doctor. · Note:  If you have already received and/or filled a prescription for one or more of the medications you've received a prescription for when leaving the hospital, you may disregard the duplicate prescription. What to do at [facility] Resume your prehospital diet. If you have excessive nausea or vomitting call your doctor's office Wear portable sequential compressive devices at least 18 hours per day for 2 weeks. They may be used beyond 2 weeks as needed to help control swelling. RAEGAN hose should be worn during the day  may be removed for sleep. Should be worn on both legs for 2 weeks and then on the operative extremity for a total of 6 weeks. Begin In-Home Physical Therapy; 3 times a week to work on gait training, range of motion, strengthening, and weight bearing exercises as tolerable. Continue to use your walker or cane when walking; may progress from the walker to a cane to complete total bearing as tolerable. Keep incision DRY. You may need to sponge bathe to avoid getting the incision wet. When to Call - Call if you have a temperature greater then 101 
- Unable to keep food down - Are unable to bear any wieght  
- Need a pain medication refill Information obtained by : 
I understand that if any problems occur once I am at home I am to contact my physician. I understand and acknowledge receipt of the instructions indicated above. Physician's or R.N.'s Signature                                                                  Date/Time Patient or Representative Signature                                                          Date/Time [employer] Announcement We are excited to announce that we are making your provider's discharge notes available to you in [employer]. You will see these notes when they are completed and signed by the physician that discharged you from your recent hospital stay. If you have any questions or concerns about any information you see in [employer], please call the Health Information Department where you were seen or reach out to your Primary Care Provider for more information about your plan of care. Introducing [facility]! Ángela Tyson introduces [employer] patient portal. Now you can access parts of your medical record, email your doctor's office, and request medication refills online. 1. In your internet browser, go to [email or web address] 2. Click on the First Time User? Click Here link in the Sign In box. You will see the New Member Sign Up page. 3. Enter your [employer] Access Code exactly as it appears below. You will not need to use this code after youve completed the sign-up process. If you do not sign up before the expiration date, you must request a new code. · [employer] Access Code: [email or web address] Expires: 6/6/2018  9:00 AM 
 
4. Enter the last four digits of your Social Security Number (xxxx) and Date of Birth (mm/dd/yyyy) as indicated and click Submit. You will be taken to the next sign-up page. 5. Create a [employer] ID.  This will be your [employer] login ID and cannot be changed, so think of one that is secure and easy to remember. 6. Create a [employer] password. You can change your password at any time. 7. Enter your Password Reset Question and Answer. This can be used at a later time if you forget your password. 8. Enter your e-mail address. You will receive e-mail notification when new information is available in [address] 9. Click Sign Up. You can now view and download portions of your medical record. 10. Click the Download Summary menu link to download a portable copy of your medical information. If you have questions, please visit the Frequently Asked Questions section of the [employer] website. Remember, [employer] is NOT to be used for urgent needs. For medical emergencies, dial 911. Now available from your iPhone and Android! Unresulted Labs-Please follow up with your PCP about these lab tests Order Current Status [facility] TECHNOLOGIST SERVICE In process Providers Seen During Your Hospitalization Provider Specialty Primary office phone Claude Corona, MD Orthopedic Surgery [phone] Your Primary Care Physician (PCP) Primary Care Physician Office Phone Office Fax [facility] [phone] You are allergic to the following Allergen Reactions Pcn (Penicillins) Hives Vicodin (Hydrocodone-Acetaminophen) Rash Recent Documentation Height Weight BMI OB Status Smoking Status 1.676 m 92.6 kg 32.96 kg/m2 Hysterectomy Never Smoker Emergency Contacts Name Discharge Info Relation Home Work Mobile [facility] DISCHARGE CAREGIVER [3] Son [22]   [phone] Glory  DISCHARGE CAREGIVER [3] Daughter [21]   [phone] Patient Belongings  The following personal items are in your possession at time of discharge: 
  Dental Appliances: None  Visual Aid: None      Home Medications: None Jewelry: None  Clothing: Undergarments, Socks, Footwear, Shirt, Pants (to be given to son, Reginia Flight)    Other Valuables: Cell Phone, Derrick Lopez (with son Reginia Flight) Please provide this summary of care documentation to your next provider. Signatures-by signing, you are acknowledging that this After Visit Summary has been reviewed with you and you have received a copy. Patient Signature:  ____________________________________________________________ Date:  ____________________________________________________________  
  
Marlyse Leaks Provider Signature:  ____________________________________________________________ Date:  ____________________________________________________________

## 2018-03-21 NOTE — PROGRESS NOTES
Problem: Mobility Impaired (Adult and Pediatric)  Goal: *Acute Goals and Plan of Care (Insert Text)  Goals to be addressed in 1-4 days:  STG  1. Rolling L to R to L modified independent for positioning. 2. Supine to sit to supine S with HR for meals. 3. Sit to stand to sit S with RW in prep for ambulation. LTG  1. Ambulate >150ft S with RW, WBAT, for home/community mobility. 2. Ascend/descend a >4 stair steps CGA with HR for home entry. 3. Tolerate up in chair 1-2 hours for ADLs. 4. Patient/family to be independent with HEP for follow-up care and safe discharge. Outcome: Progressing Towards Goal  physical Therapy EVALUATION    Patient: Arti Cruz (39 y.o. female)  Date: 3/21/2018  Primary Diagnosis: LEFT HIP OSTEOARTHRITIS  S/P total hip arthroplasty  Procedure(s) (LRB):  LEFT TOTAL HIP ARTHROPLASTY  ANTERIOR APPROACH W/C-ARM (Left) Day of Surgery   Precautions:   Fall, WBAT    ASSESSMENT :  Based on the objective data described below, the patient presents with lower extremity weakness, decreased gait quality and endurance, decreased L hip ROM/flexibility, and overall limitations in functional mobility s/p L THR AA. Pt performed supine to sit with CGA, sit to stand with CGA. Patient ambulated 30 feet with RW, GB applied, WBAT, CGA; gait duration limited by drowsiness. Pt transferred to/from toilet with CGA. Pt tolerated session well as evidenced by no c/o increased pain. SPO2 >95% on RA. Patient would benefit from skilled inpatient physical therapy to address deficits, progress as tolerated to achieve long term goals and allow safe discharge. Patient will benefit from skilled intervention to address the above impairments.   Patients rehabilitation potential is considered to be Good  Factors which may influence rehabilitation potential include:   []         None noted  []         Mental ability/status  [x]         Medical condition  []         Home/family situation and support systems  []         Safety awareness  []         Pain tolerance/management  []         Other:      PLAN :  Recommendations and Planned Interventions:  [x]           Bed Mobility Training             [x]    Neuromuscular Re-Education  [x]           Transfer Training                   []    Orthotic/Prosthetic Training  [x]           Gait Training                          []    Modalities  [x]           Therapeutic Exercises          []    Edema Management/Control  [x]           Therapeutic Activities            [x]    Patient and Family Training/Education  []           Other (comment):    Frequency/Duration: Patient will be followed by physical therapy twice daily to address goals. Discharge Recommendations: Home Health  Further Equipment Recommendations for Discharge: N/A     SUBJECTIVE:   Patient stated I am just so tired.     OBJECTIVE DATA SUMMARY:     Past Medical History:   Diagnosis Date    Arrhythmia     irregular     Arthritis     GERD (gastroesophageal reflux disease)     Hypertension 2015    Liver disease     fatty liver     Past Surgical History:   Procedure Laterality Date    HX BACK SURGERY  1992    HX BREAST LUMPECTOMY Right     HX CHOLECYSTECTOMY      HX HERNIA REPAIR  1985    inguinal    HX HYSTERECTOMY  2006    HX LUMBAR FUSION  2002     Barriers to Learning/Limitations: None  Compensate with: N/A  Prior Level of Function/Home Situation: Independent amb s/AD  Home Situation  Home Environment: Private residence  # Steps to Enter: 4  Rails to Enter: Yes  Hand Rails : Right  One/Two Story Residence: One story  Living Alone: No  Support Systems: Family member(s)  Patient Expects to be Discharged to[de-identified] Private residence  Current DME Used/Available at Home: Walker, rolling  Critical Behavior:  Neurologic State: Drowsy  Skin Condition/Temp: Warm  Skin Integrity: Intact; Incision (comment) (Left hip)  Skin Integumentary  Skin Color: Appropriate for ethnicity  Skin Condition/Temp: Warm  Skin Integrity: Intact; Incision (comment) (Left hip)  Turgor: Non-tenting  Strength:    Strength: Generally decreased, functional  Tone & Sensation:   Tone: Normal  Sensation: Impaired  Range Of Motion:  AROM: Generally decreased, functional  PROM: Generally decreased, functional  Functional Mobility:  Bed Mobility:  Supine to Sit: Contact guard assistance  Sit to Supine: Contact guard assistance  Transfers:  Sit to Stand: Contact guard assistance  Stand to Sit: Contact guard assistance  Balance:   Sitting: Intact  Standing: Intact; With support  Ambulation/Gait Training:  Distance (ft): 30 Feet (ft)  Assistive Device: Gait belt;Walker, rolling  Ambulation - Level of Assistance: Contact guard assistance  Gait Description (WDL): Exceptions to WDL  Gait Abnormalities: Decreased step clearance  Left Side Weight Bearing: As tolerated  Base of Support: Shift to right  Stance: Left decreased  Speed/Eve: Slow  Step Length: Right shortened;Left shortened  Swing Pattern: Left asymmetrical;Right asymmetrical  Pain:  Pain Scale 1: Numeric (0 - 10)  Pain Intensity 1: 0  Pain Location 1: Hip  Pain Orientation 1: Left  Pain Description 1: Constant  Activity Tolerance:   Fair  Please refer to the flowsheet for vital signs taken during this treatment. After treatment:   []         Patient left in no apparent distress sitting up in chair  [x]         Patient left in no apparent distress in bed  [x]         Call bell left within reach  [x]         Nursing notified  []         Caregiver present  []         Bed alarm activated    COMMUNICATION/EDUCATION:   [x]         Fall prevention education was provided and the patient/caregiver indicated understanding. [x]         Patient/family have participated as able in goal setting and plan of care. [x]         Patient/family agree to work toward stated goals and plan of care. []         Patient understands intent and goals of therapy, but is neutral about his/her participation.   []         Patient is unable to participate in goal setting and plan of care. Thank you for this referral.  Destiny Reyna   Time Calculation: 32 mins     Eval Complexity: History: MEDIUM  Complexity : 1-2 comorbidities / personal factors will impact the outcome/ POC Exam:LOW Complexity : 1-2 Standardized tests and measures addressing body structure, function, activity limitation and / or participation in recreation  Presentation: LOW Complexity : Stable, uncomplicated  Clinical Decision Making:Low Complexity amb 30 ft c/RW, CGA for safety with mobility Overall Complexity:LOW  Mobility  Current  CJ= 20-39%   Goal  CI= 1-19%. The severity rating is based on the Level of Assistance required for Functional Mobility and ADLs.

## 2018-03-21 NOTE — ANESTHESIA PREPROCEDURE EVALUATION
Anesthetic History               Review of Systems / Medical History  Patient summary reviewed, nursing notes reviewed and pertinent labs reviewed    Pulmonary  Within defined limits                 Neuro/Psych   Within defined limits           Cardiovascular            Dysrhythmias : sinus tachycardia    Pertinent negatives: No hypertension  Exercise tolerance: >4 METS     GI/Hepatic/Renal     GERD: well controlled           Endo/Other        Arthritis  Pertinent negatives: No diabetes, hypothyroidism and hyperthyroidism   Other Findings            Physical Exam    Airway  Mallampati: II  TM Distance: 4 - 6 cm  Neck ROM: normal range of motion   Mouth opening: Normal     Cardiovascular    Rhythm: regular  Rate: normal         Dental    Dentition: Caps/crowns     Pulmonary  Breath sounds clear to auscultation               Abdominal  GI exam deferred       Other Findings            Anesthetic Plan    ASA: 2  Patient did not consent to regional anesthesiaAnesthesia type: general          Induction: Intravenous  Anesthetic plan and risks discussed with: Patient and Family

## 2018-03-21 NOTE — NURSE NAVIGATOR
Spoke with Charo Machado (she says sister) who has been constantly asking info regarding patient. Informed her that all medical information has been discussed with her son who is present and in the waiting area. She was told that she could speak with him regarding any questions she may have and we  consider all patient information  confidential and to be given to next of kin only.

## 2018-03-21 NOTE — PERIOP NOTES
TRANSFER - IN REPORT:    Verbal report received from OR RN, Anesthesia on Merced Cabrera  being received from OR for routine post - op      Report consisted of patients Situation, Background, Assessment and   Recommendations(SBAR). Information from the following report(s) SBAR, Kardex, OR Summary, Procedure Summary, Intake/Output, MAR and Cardiac Rhythm Sinus tach was reviewed with the receiving nurse. Opportunity for questions and clarification was provided. Assessment completed upon patients arrival to unit and care assumed.

## 2018-03-21 NOTE — PERIOP NOTES
Pt information can only be given to [facility]/ jordyn.  Son- phone number [phone]  Updated Blade Darden that we are aware of pt information

## 2018-03-21 NOTE — PERIOP NOTES
TRANSFER - OUT REPORT:    Verbal report given to JANNETTE Feliciano on Angeli Low  being transferred to [facility], Rm 209 for routine post - op       Report consisted of patients Situation, Background, Assessment and   Recommendations(SBAR). Information from the following report(s) SBAR, Kardex, OR Summary, Procedure Summary, Intake/Output, MAR and Cardiac Rhythm Sinus was reviewed with the receiving nurse. Lines:   Peripheral IV 03/21/18 Left Hand (Active)   Site Assessment Clean, dry, & intact 3/21/2018  2:00 PM   Phlebitis Assessment 0 3/21/2018  2:00 PM   Infiltration Assessment 0 3/21/2018  2:00 PM   Dressing Status Clean, dry, & intact 3/21/2018  1:35 PM   Dressing Type Transparent 3/21/2018  1:35 PM   Hub Color/Line Status Infusing 3/21/2018  2:00 PM   Alcohol Cap Used No 3/21/2018  9:11 AM        Opportunity for questions and clarification was provided.       Patient transported with:   O2 @ 2 liters  Registered Nurse  [facility]

## 2018-03-21 NOTE — PROGRESS NOTES
Pharmacy has received your order for UBIDECARENONE/VITAMIN E MIXED (COQ10  PO), . This product is considered an herbal/dietary supplement not subject to current FDA regulations for drug products. This order will be discontinued while the patient is in the hospital.  Please resume upon patient discharge if desired.         Per [facility] Medication Policy  Delmis Peña, PharmD  [phone]

## 2018-03-21 NOTE — PROGRESS NOTES
1435 - Patient arrives to unit at this time. Dual skin assessment performed with Crystal from PACU at this time; patient had bruising on left thigh and a small scratch on outside of left thigh, otherwise skin was WDL. Admission completed at this time. Patient is A/O x 4. IV to left hand intact and patent. TEDS and plexis applied bilaterally. mepilex dressing to left hip CDI. Patient denies numbness/tingling. Pedal and radial pulses palpable. Pain 2/10. Patient was oriented to the room to include use of call bell, meal ordering, and use of incentive spirometer patient able to get up to 2500 on IS. Patient was given explanation of \" up for dinner\" program and has verbalized understanding. Phone and call bell left within reach. Plan of care for the day addressed with patient. Educated on pain medication availability and possible side effects. 1740 - Pain 4/10. PRN 1000 mg tylenol and 15 mg toradol pain medication administered at this time. Patient has been educated on side effects. Shift Summary: Patient's pain well controlled this shift. IV remains intact. Dressing to left hip remains CDI. TEDS and SCDs compression device in place.

## 2018-03-22 ENCOUNTER — HOME HEALTH ADMISSION (OUTPATIENT)
Dept: HOME HEALTH SERVICES | Facility: HOME HEALTH | Age: 58
End: 2018-03-22
Payer: COMMERCIAL

## 2018-03-22 LAB
HCT VFR BLD AUTO: 29.3 % (ref 35–45)
HGB BLD-MCNC: 9.6 G/DL (ref 12–16)

## 2018-03-22 PROCEDURE — 65270000029 HC RM PRIVATE

## 2018-03-22 PROCEDURE — 97530 THERAPEUTIC ACTIVITIES: CPT

## 2018-03-22 PROCEDURE — 97535 SELF CARE MNGMENT TRAINING: CPT

## 2018-03-22 PROCEDURE — 77030011256 HC DRSG MEPILEX <16IN NO BORD MOLN -A

## 2018-03-22 PROCEDURE — 85014 HEMATOCRIT: CPT | Performed by: ORTHOPAEDIC SURGERY

## 2018-03-22 PROCEDURE — 97116 GAIT TRAINING THERAPY: CPT

## 2018-03-22 PROCEDURE — 74011250637 HC RX REV CODE- 250/637: Performed by: PHYSICIAN ASSISTANT

## 2018-03-22 PROCEDURE — 74011250636 HC RX REV CODE- 250/636: Performed by: PHYSICIAN ASSISTANT

## 2018-03-22 PROCEDURE — 97165 OT EVAL LOW COMPLEX 30 MIN: CPT

## 2018-03-22 PROCEDURE — 36415 COLL VENOUS BLD VENIPUNCTURE: CPT | Performed by: ORTHOPAEDIC SURGERY

## 2018-03-22 PROCEDURE — 74011250636 HC RX REV CODE- 250/636: Performed by: ORTHOPAEDIC SURGERY

## 2018-03-22 RX ADMIN — DOCUSATE SODIUM 100 MG: 100 CAPSULE, LIQUID FILLED ORAL at 16:04

## 2018-03-22 RX ADMIN — ACETAMINOPHEN 1000 MG: 500 TABLET ORAL at 21:51

## 2018-03-22 RX ADMIN — KETOROLAC TROMETHAMINE 15 MG: 15 INJECTION, SOLUTION INTRAMUSCULAR; INTRAVENOUS at 00:00

## 2018-03-22 RX ADMIN — Medication 10 ML: at 14:44

## 2018-03-22 RX ADMIN — ASPIRIN 81 MG 81 MG: 81 TABLET ORAL at 09:50

## 2018-03-22 RX ADMIN — ACETAMINOPHEN 1000 MG: 500 TABLET ORAL at 06:06

## 2018-03-22 RX ADMIN — DOCUSATE SODIUM 100 MG: 100 CAPSULE, LIQUID FILLED ORAL at 21:51

## 2018-03-22 RX ADMIN — KETOROLAC TROMETHAMINE 15 MG: 15 INJECTION, SOLUTION INTRAMUSCULAR; INTRAVENOUS at 06:06

## 2018-03-22 RX ADMIN — CEFAZOLIN SODIUM 2 G: 2 SOLUTION INTRAVENOUS at 03:54

## 2018-03-22 RX ADMIN — KETOROLAC TROMETHAMINE 15 MG: 15 INJECTION, SOLUTION INTRAMUSCULAR; INTRAVENOUS at 12:00

## 2018-03-22 RX ADMIN — DOCUSATE SODIUM 100 MG: 100 CAPSULE, LIQUID FILLED ORAL at 09:50

## 2018-03-22 RX ADMIN — ASPIRIN 81 MG 81 MG: 81 TABLET ORAL at 21:51

## 2018-03-22 RX ADMIN — METOPROLOL SUCCINATE 50 MG: 50 TABLET, FILM COATED, EXTENDED RELEASE ORAL at 09:49

## 2018-03-22 RX ADMIN — SODIUM CHLORIDE 1000 ML: 900 INJECTION, SOLUTION INTRAVENOUS at 20:42

## 2018-03-22 RX ADMIN — FERROUS SULFATE TAB 325 MG (65 MG ELEMENTAL FE) 325 MG: 325 (65 FE) TAB at 09:50

## 2018-03-22 RX ADMIN — ACETAMINOPHEN 1000 MG: 500 TABLET ORAL at 14:44

## 2018-03-22 RX ADMIN — KETOROLAC TROMETHAMINE 15 MG: 15 INJECTION, SOLUTION INTRAMUSCULAR; INTRAVENOUS at 18:03

## 2018-03-22 RX ADMIN — ATORVASTATIN CALCIUM 10 MG: 10 TABLET, FILM COATED ORAL at 21:51

## 2018-03-22 NOTE — PROGRESS NOTES
[id number] - Patient ambulated to the restroom and voided xxx cc of clear, yellow urine. Patient then returned to bed. Will continue to monitor.

## 2018-03-22 NOTE — PROGRESS NOTES
2003 - Assumed care at this time. 2201 - Patient in bed at this time. Patient A&Ox4, RA. Denies chest pain and SOB. 18G IV to left hand  intact and patent. SCD compression device and TEDS  bilaterally. x3  Mepilex dressing to left hip CDI, ISABEL and Hemovac draining and patent. Hemovac draining with gravity. Denies numbness/tingling/calf pain. Pain 2/10 with a tolerable level of 3/10. Pt educated on IS use, q2h rounds, pain management, and \"Up for Meals\". Pt verbalized understanding, no concerns voiced. Call bell within reach, bed in lowest position.

## 2018-03-22 NOTE — PROGRESS NOTES
Problem: Self Care Deficits Care Plan (Adult)  Goal: *Acute Goals and Plan of Care (Insert Text)  Short Term Goals 3/22/18: Axel Casas OTR/L  In one session:  1. Pt will perform basic self care and functional ADL transfers with modified independence to supervision using AD/DME/AE as needed in order to return home safely with family. Goal met after OT session 3/22/18. Pt reports having supportive family at home who will be able to assist as needed. Outcome: Resolved/Met Date Met: 03/22/18  Occupational Therapy EVALUATION/discharge    Patient: Rohan Dumont (11 y.o. female)  Date: 3/22/2018  Primary Diagnosis: LEFT HIP OSTEOARTHRITIS  S/P total hip arthroplasty  Procedure(s) (LRB):  LEFT TOTAL HIP ARTHROPLASTY  ANTERIOR APPROACH W/C-ARM (Left) 1 Day Post-Op   Precautions:   Fall, WBAT    ASSESSMENT AND RECOMMENDATIONS:  Based on the objective data described below, the patient presents with ability to perform ADL tasks at near baseline level. After OT session today patient was able to perform all basic self care tasks and functional ADL transfers with modified independence to supervision. Pt with good activity tolerance, minimal pain, and no LOB. Pt reports having elevated toilet seat with safety frame at home. Pt reports supportive family who will be able to assist as needed. Pt with no further acute OT/ADL concerns at this time. Skilled occupational therapy is not indicated at this time. Education: joint protection; compensatory dressing tech; safety    Discharge Recommendations: Home Health  Further Equipment Recommendations for Discharge: rolling walker      SUBJECTIVE:   Patient stated I am not really having any pain.     OBJECTIVE DATA SUMMARY:     Past Medical History:   Diagnosis Date    Arrhythmia     irregular     Arthritis     GERD (gastroesophageal reflux disease)     Hypertension 2015    Liver disease     fatty liver     Past Surgical History:   Procedure Laterality Date    HX BACK SURGERY  1992    HX BREAST LUMPECTOMY Right     HX CHOLECYSTECTOMY      HX HERNIA REPAIR  1985    inguinal    HX HYSTERECTOMY  2006    HX LUMBAR FUSION  2002     Barriers to Learning/Limitations: None  Compensate with: visual, verbal, tactile, kinesthetic cues/model    G-Codes (GP)  Self Care   Current  CI= 1-19%   Goal  CI= 1-19%   D/C  CI= 1-19%  The severity rating is based on the professional judgement & direct observation of Level of Assistance required for Functional Mobility and ADLs. Eval Complexity: History: LOW Complexity : Brief history review ; Examination: LOW Complexity : 1-3 performance deficits relating to physical, cognitive , or psychosocial skils that result in activity limitations and / or participation restrictions ; Decision Making:LOW Complexity : No comorbidities that affect functional and no verbal or physical assistance needed to complete eval tasks     Prior Level of Function/Home Situation: Pt lives with family. Pt reports fully independent with all ADLs and IADLs. Pt drives and works full time. Home Situation  Home Environment: Private residence  # Steps to Enter: 4  Rails to Enter: Yes  Hand Rails : Right  One/Two Story Residence: One story  Living Alone: No  Support Systems: Family member(s)  Patient Expects to be Discharged to[de-identified] Private residence  Current DME Used/Available at Home: Raised toilet seat, Safety frame toliet, Grab bars  Tub or Shower Type: Shower  [x]     Right hand dominant   []     Left hand dominant  Cognitive/Behavioral Status:  Neurologic State: Alert; Appropriate for age  Orientation Level: Oriented X4  Cognition: Appropriate decision making; Appropriate safety awareness  Safety/Judgement: Awareness of environment  Skin: site of incision L hip anterior approach  Edema: mild LLE  Vision/Perceptual:    Corrective Lenses: Glasses  Coordination:   Fine Motor Skills-Upper: Left Intact; Right Intact    Gross Motor Skills-Upper: Left Intact; Right Intact  Balance:  Sitting: Intact  Standing: Intact; With support     Functional Mobility and Transfers for ADLs:  Transfers:  Sit to Stand: Supervision   Bed to Chair: Supervision    Toilet Transfer : Supervision     Bathroom Mobility: Supervision/set up  ADL Assessment:  Oral Facial Hygiene/Grooming: Contact guard assistance  Upper Body Dressing: Independent  Lower Body Dressing: Minimum assistance  Toileting: Contact guard assistance   ADL Intervention:  Grooming  Grooming Assistance: Modified independent (standing at sink)  Washing Face: Modified independent  Washing Hands: Modified independent  Brushing Teeth: Modified independent    Lower Body Dressing Assistance  Underpants: Modified independent  Pants With Elastic Waist: Modified independent  Socks: Modified independent    Toileting  Toileting Assistance: Supervision/set up  Bladder Hygiene: Supervision/set-up  Clothing Management: Supervision/set-up  Adaptive Equipment: Walker;Elevated seat    Cognitive Retraining  Safety/Judgement: Awareness of environment    Pain:  Pre-treatment: 1/10  Post-treatment: 1/10  Activity Tolerance:   WFL; standing tolerance 7-10 minutes; no LOB; minimal pain; able to perform dressing tasks without rest breaks  Please refer to the flowsheet for vital signs taken during this treatment. After treatment:   [x]  Patient left in no apparent distress sitting up in chair  []  Patient left in no apparent distress in bed  [x]  Call bell left within reach  [x]  Nursing notified  []  Caregiver present  []  Bed alarm activated    COMMUNICATION/EDUCATION:   Communication/Collaboration:  [x]      Home safety education was provided and the patient/caregiver indicated understanding. [x]      Patient/family have participated as able and agree with findings and recommendations. []      Patient is unable to participate in plan of care at this time.     Thank you for this referral.  Aleja Champion OT  Time Calculation: 26 mins

## 2018-03-22 NOTE — PROGRESS NOTES
7:08 PM     Notified JANNETTE Ovalle and JANNETTE Hernandez of PT BP.  I attempted and left and right arm recived LOW BP. Jacob Baron CNA\

## 2018-03-22 NOTE — PROGRESS NOTES
0735  Pt is awake, alert, oriented x 4. Sitting on chair . Mepilex dressing to left hip. Pt has ISABEL and HVAC drain with serosanguinous drainage. 9:58 AM   Pain level 2/10, not pain but soreness. PT in to see pt. Lungs are clear. Abdomen soft with hypoactive BS.    1015  Pt was seen by PT. Pt ambulated in hallway and stairs. 12:03 PM   Pain level 6/10 . Pt stated pain increased after walking the stairs. Medicated with Toradol 15 mg IV. Pt does not want oxycodone at this time. Pt sitting at edge of bed and eating lunch. 1:17 PM   ISABEL drained 15 ml. Hemovac drained 100 ml og serosanguinous drainage.  hemovac drain removed as ordered. 2:46 PM   Pt resting in bed. Pain level 2/10. Received Tylenol 1000 mg po. Family at bedside. 4:01 PM   Pt ambulated with PT in hallway and stairs. Did well with ambulation. Voided in BR.  6:00 PM   Pt sitting  at edge of bed and eating dinner. Pt ambulated and voided in BR then sat at edge of bed and continued eating. Pain level 1/10.

## 2018-03-22 NOTE — ROUTINE PROCESS
1950- Bedside and Verbal shift change report given to [facility] by Cassy Adams RN. Report included the following information SBAR, Kardex, OR Summary, Intake/Output and MAR.

## 2018-03-22 NOTE — PROGRESS NOTES
Problem: Falls - Risk of  Goal: *Absence of Falls  Document Quentin Fall Risk and appropriate interventions in the flowsheet.    Outcome: Progressing Towards Goal  Fall Risk Interventions:  Mobility Interventions: Communicate number of staff needed for ambulation/transfer    Mentation Interventions: Adequate sleep, hydration, pain control    Medication Interventions: Patient to call before getting OOB, Teach patient to arise slowly    Elimination Interventions: Call light in reach, Patient to call for help with toileting needs, Toileting schedule/hourly rounds

## 2018-03-22 NOTE — PROGRESS NOTES
Problem: Mobility Impaired (Adult and Pediatric)  Goal: *Acute Goals and Plan of Care (Insert Text)  Goals to be addressed in 1-4 days:  STG  1. Rolling L to R to L modified independent for positioning. 2. Supine to sit to supine S with HR for meals. 3. Sit to stand to sit S with RW in prep for ambulation. LTG  1. Ambulate >150ft S with RW, WBAT, for home/community mobility. 2. Ascend/descend a >4 stair steps CGA with HR for home entry. 3. Tolerate up in chair 1-2 hours for ADLs. 4. Patient/family to be independent with HEP for follow-up care and safe discharge. Outcome: Progressing Towards Goal  physical Therapy TREATMENT    Patient: Kiley Bryan (47 y.o. female)  Date: 3/22/2018  Diagnosis: LEFT HIP OSTEOARTHRITIS  S/P total hip arthroplasty <principal problem not specified>  Procedure(s) (LRB):  LEFT TOTAL HIP ARTHROPLASTY  ANTERIOR APPROACH W/C-ARM (Left) 1 Day Post-Op  Precautions: Fall, WBAT   Chart, physical therapy assessment, plan of care and goals were reviewed. ASSESSMENT:  Pt demonstrated improved gait quality and ambulation at supervision/Emily level. Pt with continued decreased step length and harpreet due to pain and faitgue. Pt able to negotiate stair with more confidence this session, on 4in step. Will continue for one additional session tomorrow due to patient not discharged until tomorrow per MD, and to allow one more training session to improve pt confidence and safety with stair negotiation into home. Progression toward goals:  [x]      Improving appropriately and progressing toward goals  []      Improving slowly and progressing toward goals  []      Not making progress toward goals and plan of care will be adjusted     PLAN:  Patient continues to benefit from skilled intervention to address the above impairments. Continue treatment per established plan of care.   Discharge Recommendations:  Home Health  Further Equipment Recommendations for Discharge:  N/A     SUBJECTIVE:   Patient stated I am feeling tired.     OBJECTIVE DATA SUMMARY:   Critical Behavior:  Neurologic State: Alert, Appropriate for age  Orientation Level: Oriented X4  Cognition: Appropriate for age attention/concentration  Safety/Judgement: Awareness of environment  Functional Mobility Training:  Bed Mobility:  Supine to Sit: Modified independent  Sit to Supine: Modified independent  Transfers:  Sit to Stand: Modified independent;Supervision  Stand to Sit: Modified independent;Supervision  Bed to Chair: Supervision  Balance:  Sitting: Intact  Standing: Intact; With support  Ambulation/Gait Training:  Distance (ft): 200 Feet (ft)  Assistive Device: Gait belt;Walker, rolling  Ambulation - Level of Assistance: Supervision;Modified independent  Gait Abnormalities: Decreased step clearance; Antalgic; Step to gait  Left Side Weight Bearing: As tolerated  Base of Support: Shift to right  Stance: Left decreased  Speed/Eve: Slow  Step Length: Right shortened;Left shortened  Swing Pattern: Left asymmetrical;Right asymmetrical  Stairs:  Number of Stairs Trained: 2  Stairs - Level of Assistance: Contact guard assistance;Minimum assistance  Rail Use: Both  Pain:  Pain Scale 1: Numeric (0 - 10)  Pain Intensity 1: 2  Pain Location 1: Hip  Pain Orientation 1: Left  Pain Description 1: Aching  Pain Intervention(s) 1: Medication (see MAR)  Activity Tolerance:   Good  Please refer to the flowsheet for vital signs taken during this treatment.   After treatment:   [] Patient left in no apparent distress sitting up in chair  [x] Patient left in no apparent distress in bed  [x] Call bell left within reach  [x] Nursing notified  [] Caregiver present  [] Bed alarm activated      Gilma Reyna   Time Calculation: 30 mins

## 2018-03-22 NOTE — PROGRESS NOTES
Chart reviewed, met with pt in room. Pt plans discharge home, daughter and 3 children live with pt and are able to assist. [address] offered, pt chose [facility] [phone] for follow up; referral placed with CMS. Pt has RW for home. Care Management Interventions  PCP Verified by CM:  Yes  Transition of Care Consult (CM Consult): [address]: Yes  Discharge Durable Medical Equipment: No  Physical Therapy Consult: Yes  Occupational Therapy Consult: Yes  Current Support Network: Own Home  Confirm Follow Up Transport: Family  Plan discussed with Pt/Family/Caregiver: Yes  Freedom of Choice Offered: Yes  Discharge Location  Discharge Placement: Home with home health

## 2018-03-22 NOTE — PROGRESS NOTES
2330 - Assumed care of patient at this time. Patient is alert and oriented x 4. Patient denies chest pain, shortness of breath, or numbness or tingling in the upper or lower extremities. Mepilex dressing on the left hip is clean, dry and intact. Hemovac and Terrence-Headley drain dressings are clean, dry and intact. Sequential compression device and RAEGAN hose in place on the patient bilaterally. 18g IV in the left hand is patent and infusing LR @ 125mL/hr. Patient currently experiencing 0/10 pain. Bed is in lowest position with the wheels locked. Siderails x 3 are up. Call bell within reach. Patient is currently resting in bed in no apparent distress. Will continue to monitor. 2345 - Head to toe assessment performed at this time. Patient has no complaints of chest pain or shortness of breath. Denies any numbness or tingling in extremities. Lungs are clear to auscultation. Bowel sounds are present in all 4 quadrants. Patient educated how to  actively manage their pain. Patient encouraged to use the incentive spirometer. Patient educated on the side effects of medications. Explained the importance of ambulation. Instructed the patient not to attempt to get out of bed and/or ambulate without a staff member present. Patient verbalized understanding. Patient left in bed with call light within reach, bed in lowest position with wheels locked, and siderails x 3 up. Will continue to monitor. 2350 - Patient ambulated to the restroom and voided 500 cc of clear, yellow urine. Patient then returned to bed. Will continue to monitor. 0400 - Patient ambulated to the restroom and voided 900 cc of clear, yellow urine. Patient then returned to bed. Will continue to monitor. 0430 - Mepilex dressing over the Terrence-Headley drain fell off and was replaced at this time. 7455 - Patient ambulated to the restroom and voided 400 cc of clear, yellow urine. IV fluids discontinued at this time.   Patient now in a chair for breakfast.

## 2018-03-22 NOTE — PROGRESS NOTES
Problem: Falls - Risk of  Goal: *Absence of Falls  Document Quentin Fall Risk and appropriate interventions in the flowsheet.    Outcome: Progressing Towards Goal  Fall Risk Interventions:  Mobility Interventions: Communicate number of staff needed for ambulation/transfer, Patient to call before getting OOB, PT Consult for mobility concerns, PT Consult for assist device competence, Strengthening exercises (ROM-active/passive), Utilize walker, cane, or other assitive device    Mentation Interventions: Adequate sleep, hydration, pain control    Medication Interventions: Patient to call before getting OOB, Teach patient to arise slowly    Elimination Interventions: Elevated toilet seat, Call light in reach, Patient to call for help with toileting needs, Toilet paper/wipes in reach, Toileting schedule/hourly rounds

## 2018-03-22 NOTE — ROUTINE PROCESS
Bedside and Verbal shift change report given to GUY Shaver RN by Emma David RN. Report included the following information SBAR, Kardex, OR Summary, Intake/Output and MAR.

## 2018-03-22 NOTE — PROGRESS NOTES
Progress Note        Patient: Luis Aguiar MRN: [id number]  SSN: [id number]    YOB: 1960  Age: 59 y.o. Sex: female      1 Day Post-Op status post Procedure(s) (LRB):  LEFT TOTAL HIP ARTHROPLASTY  ANTERIOR APPROACH W/C-ARM (Left)    Admit Date: 3/21/2018  Admit Diagnosis: LEFT HIP OSTEOARTHRITIS  S/P total hip arthroplasty    Subjective:       Doing well. Mild light-headedness. No SOB. No Chest Pain. No Nausea or Vomiting. No problems eating or voiding. Objective:        Temp (24hrs), Av.2 °F (36.8 °C), Min:97.3 °F (36.3 °C), Max:99.2 °F (37.3 °C)    Body mass index is 32.96 kg/(m^2). Patient Vitals for the past 12 hrs:   BP Temp Pulse Resp SpO2   18 0732 115/49 97.8 °F (36.6 °C) 81 17 100 %   18 0037 108/84 98.1 °F (36.7 °C) 76 16 94 %   18 2204 95/57 - - - -   18 102/58 97.3 °F (36.3 °C) 81 16 95 %     No results for input(s): HGB, HCT, INR, NA, K, CL, CO2, BUN, CREA, GLU, HGBEXT, HCTEXT in the last 72 hours. No lab exists for component: INREXT    Physical Exam:  Vital Signs are Stable. No Acute Distress. Alert and Oriented. Negative Homans sign. Toes AROM Full. Neurovascular exam is normal.    Dressing is Clean, Dry, and Intact. Assessment/Plan:     1. Patient doing well. 2. Out of BED / PT / WBAT. Anterior Hip Precautions  3. DVT ppx: Mobilization, Ecotrin, RAEGAN hose, and mechanical compression  4. D/c planning     Continue PT/OT  Discharge Plan: Home with home health later this afternoon pending PT clearance and pain control with oral analgesia. Signed By: Won Peñaloza PA-C     2018               Leave ISABEL drain in place until tomorrow will plan DC based on drain output.

## 2018-03-22 NOTE — PROGRESS NOTES
Problem: Mobility Impaired (Adult and Pediatric)  Goal: *Acute Goals and Plan of Care (Insert Text)  Goals to be addressed in 1-4 days:  STG  1. Rolling L to R to L modified independent for positioning. 2. Supine to sit to supine S with HR for meals. 3. Sit to stand to sit S with RW in prep for ambulation. LTG  1. Ambulate >150ft S with RW, WBAT, for home/community mobility. 2. Ascend/descend a >4 stair steps CGA with HR for home entry. 3. Tolerate up in chair 1-2 hours for ADLs. 4. Patient/family to be independent with HEP for follow-up care and safe discharge. Outcome: Progressing Towards Goal  physical Therapy TREATMENT    Patient: Adis Ruizole (11 y.o. female)  Date: 3/22/2018  Diagnosis: LEFT HIP OSTEOARTHRITIS  S/P total hip arthroplasty <principal problem not specified>  Procedure(s) (LRB):  LEFT TOTAL HIP ARTHROPLASTY  ANTERIOR APPROACH W/C-ARM (Left) 1 Day Post-Op  Precautions: Fall, WBAT   Chart, physical therapy assessment, plan of care and goals were reviewed. ASSESSMENT:  Pt progressed and met gait goal. However during stair negotiation pt required Neville during stair descension due to pain and fear. Will continue to see pt for stair training and improve comfort level/safety with stairs. Progression toward goals:  [x]      Improving appropriately and progressing toward goals  []      Improving slowly and progressing toward goals  []      Not making progress toward goals and plan of care will be adjusted     PLAN:  Patient continues to benefit from skilled intervention to address the above impairments. Continue treatment per established plan of care. Discharge Recommendations:  Home Health  Further Equipment Recommendations for Discharge:  N/A     SUBJECTIVE:   Patient stated I am doing ok.     OBJECTIVE DATA SUMMARY:   Critical Behavior:  Neurologic State: Alert, Appropriate for age  Orientation Level: Oriented X4  Cognition: Appropriate for age attention/concentration  Safety/Judgement: Awareness of environment  Functional Mobility Training:  Transfers:  Sit to Stand: Supervision  Stand to Sit: Supervision  Bed to Chair: Supervision  Balance:  Sitting: Intact  Standing: Intact; With support  Ambulation/Gait Training:  Distance (ft): 150 Feet (ft)  Assistive Device: Gait belt;Walker, rolling  Ambulation - Level of Assistance: Stand-by assistance;Supervision  Gait Abnormalities: Decreased step clearance; Antalgic; Step to gait  Left Side Weight Bearing: As tolerated  Base of Support: Shift to right  Stance: Left decreased  Speed/Eve: Slow  Step Length: Right shortened;Left shortened  Swing Pattern: Left asymmetrical;Right asymmetrical  Stairs:  Number of Stairs Trained: 2  Stairs - Level of Assistance: Minimum assistance  Rail Use: Both  Pain:  Pain Scale 1: Numeric (0 - 10)  Pain Intensity 1: 2  Pain Location 1: Hip  Pain Orientation 1: Left  Pain Description 1: Aching  Pain Intervention(s) 1: Medication (see MAR)  Activity Tolerance:   Good  Please refer to the flowsheet for vital signs taken during this treatment.   After treatment:   [] Patient left in no apparent distress sitting up in chair  [x] Patient left in no apparent distress in bed  [x] Call bell left within reach  [x] Nursing notified  [] Caregiver present  [] Bed alarm activated      Concetta Reyna   Time Calculation: 23 mins

## 2018-03-23 ENCOUNTER — HOME CARE VISIT (OUTPATIENT)
Dept: HOME HEALTH SERVICES | Facility: HOME HEALTH | Age: 58
End: 2018-03-23

## 2018-03-23 LAB
ANION GAP SERPL CALC-SCNC: 8 MMOL/L (ref 3–18)
BUN SERPL-MCNC: 12 MG/DL (ref 7–18)
BUN/CREAT SERPL: 14 (ref 12–20)
CALCIUM SERPL-MCNC: 8.9 MG/DL (ref 8.5–10.1)
CHLORIDE SERPL-SCNC: 110 MMOL/L (ref 100–108)
CO2 SERPL-SCNC: 29 MMOL/L (ref 21–32)
CREAT SERPL-MCNC: 0.87 MG/DL (ref 0.6–1.3)
GLUCOSE SERPL-MCNC: 111 MG/DL (ref 74–99)
HCT VFR BLD AUTO: 32.4 % (ref 35–45)
HGB BLD-MCNC: 10.4 G/DL (ref 12–16)
POTASSIUM SERPL-SCNC: 4.1 MMOL/L (ref 3.5–5.5)
SODIUM SERPL-SCNC: 147 MMOL/L (ref 136–145)

## 2018-03-23 PROCEDURE — 80048 BASIC METABOLIC PNL TOTAL CA: CPT | Performed by: ORTHOPAEDIC SURGERY

## 2018-03-23 PROCEDURE — 85018 HEMOGLOBIN: CPT | Performed by: ORTHOPAEDIC SURGERY

## 2018-03-23 PROCEDURE — 97110 THERAPEUTIC EXERCISES: CPT

## 2018-03-23 PROCEDURE — 74011250637 HC RX REV CODE- 250/637: Performed by: PHYSICIAN ASSISTANT

## 2018-03-23 PROCEDURE — 65270000029 HC RM PRIVATE

## 2018-03-23 PROCEDURE — 97116 GAIT TRAINING THERAPY: CPT

## 2018-03-23 PROCEDURE — 74011250636 HC RX REV CODE- 250/636: Performed by: PHYSICIAN ASSISTANT

## 2018-03-23 PROCEDURE — 77030011256 HC DRSG MEPILEX <16IN NO BORD MOLN -A

## 2018-03-23 PROCEDURE — 36415 COLL VENOUS BLD VENIPUNCTURE: CPT | Performed by: ORTHOPAEDIC SURGERY

## 2018-03-23 RX ORDER — OXYCODONE AND ACETAMINOPHEN 5; 325 MG/1; MG/1
TABLET ORAL
Qty: 84 TAB | Refills: 0 | Status: ON HOLD | OUTPATIENT
Start: 2018-03-23 | End: 2019-11-11 | Stop reason: CLARIF

## 2018-03-23 RX ORDER — GUAIFENESIN 100 MG/5ML
81 LIQUID (ML) ORAL 2 TIMES DAILY
Qty: 84 TAB | Refills: 0 | Status: SHIPPED | OUTPATIENT
Start: 2018-03-23

## 2018-03-23 RX ADMIN — LOSARTAN POTASSIUM 50 MG: 50 TABLET ORAL at 21:01

## 2018-03-23 RX ADMIN — DOCUSATE SODIUM 100 MG: 100 CAPSULE, LIQUID FILLED ORAL at 21:01

## 2018-03-23 RX ADMIN — DOCUSATE SODIUM 100 MG: 100 CAPSULE, LIQUID FILLED ORAL at 16:46

## 2018-03-23 RX ADMIN — ACETAMINOPHEN 1000 MG: 500 TABLET ORAL at 14:26

## 2018-03-23 RX ADMIN — ONDANSETRON 4 MG: 2 INJECTION INTRAMUSCULAR; INTRAVENOUS at 07:26

## 2018-03-23 RX ADMIN — ATORVASTATIN CALCIUM 10 MG: 10 TABLET, FILM COATED ORAL at 21:01

## 2018-03-23 RX ADMIN — ASPIRIN 81 MG 81 MG: 81 TABLET ORAL at 08:37

## 2018-03-23 RX ADMIN — ACETAMINOPHEN 1000 MG: 500 TABLET ORAL at 07:23

## 2018-03-23 RX ADMIN — FERROUS SULFATE TAB 325 MG (65 MG ELEMENTAL FE) 325 MG: 325 (65 FE) TAB at 07:23

## 2018-03-23 RX ADMIN — DOCUSATE SODIUM 100 MG: 100 CAPSULE, LIQUID FILLED ORAL at 08:37

## 2018-03-23 RX ADMIN — ASPIRIN 81 MG 81 MG: 81 TABLET ORAL at 21:01

## 2018-03-23 RX ADMIN — Medication 10 ML: at 07:19

## 2018-03-23 NOTE — PROGRESS NOTES
Problem: Falls - Risk of  Goal: *Absence of Falls  Document Quentin Fall Risk and appropriate interventions in the flowsheet.    Outcome: Progressing Towards Goal  Fall Risk Interventions:  Mobility Interventions: Patient to call before getting OOB, Utilize walker, cane, or other assitive device    Mentation Interventions: Adequate sleep, hydration, pain control    Medication Interventions: Patient to call before getting OOB, Teach patient to arise slowly    Elimination Interventions: Call light in reach, Patient to call for help with toileting needs, Toileting schedule/hourly rounds

## 2018-03-23 NOTE — ROUTINE PROCESS
Bedside and Verbal shift change report given to Andrea Patterson RN (oncoming nurse) by Girtha Gowers, RN (offgoing nurse). Report included the following information SBAR, Procedure Summary, Intake/Output, MAR and Recent Results.

## 2018-03-23 NOTE — ROUTINE PROCESS
1925  Bedside and Verbal shift change report given to Gloria Carson RN (oncoming nurse) by Chelsea Damico RN (offgoing nurse). Report included the following information SBAR, Kardex and MAR.

## 2018-03-23 NOTE — PROGRESS NOTES
0726  Pt is awake, alert, oriented x 4. Sitting on chair. Pt complaining of nausea this morning. Medicated with Zofran 4 mg IV. Pt was seen by PA [facility]. Plan for pt is to go home tomorrow. Pt has mepilex dressing to left hip. With ISABEL drain with serosanguinous drainage. 8:49 AM Nausea subsided. Mepilex Dressing  over ISABEL site is loose on 1 side. ISABEL drain intact with suture. New square mepilex applied. Mepilex dressing on right hip incision site was changed. Incision intact with steristrips. No drainage or bleeding noted. New Mepilex dressing was applied. Pt voided in BR then back in bed. Pt's Metoprolol was held this morning because of low /60 OR 81. PA [facility] was aware. 1030  Pt ambulated in hallway and stairs. Pt was cleared by PT for discharge. 11:03 AM   BP is now 125/58, OR 79. [facility] is aware. Will not give metoprolol XL. dose this morning as per PA [facility]. 12:16 PM   Pt ate lunch well without nausea or vomiting. 1:35 PM   Pt ambulated and voided in BR. Pt's IVF infiltrated and removed. ISABEL drained 25 ml of pinkish serous fluid. 1:45 PM   Pt ambulated in hallway with family member.

## 2018-03-23 NOTE — PROGRESS NOTES
1944 - Assumed care at this time. 2059 - Patient in ambulated OOB to BR with steady gait at this time. Patient A&Ox4, RA. Denies chest pain and SOB. SCD compression device and TEDS  bilaterally. x2  Mepilex dressing to left hip CDI, ISABEL  draining and patent. Hemovac draining with gravity. Denies numbness/tingling/calf pain. Pain 2/10 with a tolerable level of 6/10. Pt educated on IS use, q2h rounds, pain management, and \"Up for Meals\". Pt verbalized understanding, no concerns voiced. Call bell within reach, bed in lowest position. 1 - Patient ambulated OOB to BR with steady gait. No concerns voiced. Call bell within reach, bed in lowest position. 9984 - Patient ambulated OOB to BR with steady gait. CHG wipes completed. No concerns voiced. Call bell within reach, bed in lowest position. 1080 - Pt ambulated OOB up in chair with steady gait. No concerns voiced. Telephone and call bell within reach.

## 2018-03-23 NOTE — PROGRESS NOTES
Problem: Mobility Impaired (Adult and Pediatric)  Goal: *Acute Goals and Plan of Care (Insert Text)  Goals to be addressed in 1-4 days:  STG  1. Rolling L to R to L modified independent for positioning. 2. Supine to sit to supine S with HR for meals. 3. Sit to stand to sit S with RW in prep for ambulation. LTG  1. Ambulate >150ft S with RW, WBAT, for home/community mobility. 2. Ascend/descend a >4 stair steps CGA with HR for home entry. 3. Tolerate up in chair 1-2 hours for ADLs. 4. Patient/family to be independent with HEP for follow-up care and safe discharge. Attempted to see pt for PT session. Pt reports her BP has been low and due to her high drain output she will be staying another day. Pt requested pt return later in the AM due to her having nausea at this time.

## 2018-03-23 NOTE — PROGRESS NOTES
Problem: Mobility Impaired (Adult and Pediatric)  Goal: *Acute Goals and Plan of Care (Insert Text)  Goals to be addressed in 1-4 days:  STG  1. Rolling L to R to L modified independent for positioning. 2. Supine to sit to supine S with HR for meals. 3. Sit to stand to sit S with RW in prep for ambulation. LTG  1. Ambulate >150ft S with RW, WBAT, for home/community mobility. 2. Ascend/descend a >4 stair steps CGA with HR for home entry. 3. Tolerate up in chair 1-2 hours for ADLs. 4. Patient/family to be independent with HEP for follow-up care and safe discharge. Outcome: Resolved/Met Date Met: 03/23/18  physical Therapy TREATMENT/DISCHARGE    Patient: Carmina Farah (21 y.o. female)  Date: 3/23/2018  Diagnosis: LEFT HIP OSTEOARTHRITIS  S/P total hip arthroplasty <principal problem not specified>  Procedure(s) (LRB):  LEFT TOTAL HIP ARTHROPLASTY  ANTERIOR APPROACH W/C-ARM (Left) 2 Days Post-Op  Precautions: Fall, WBAT  Chart, physical therapy assessment, plan of care and goals were reviewed. ASSESSMENT:  Patient has met short and long term goals at this time. Patient performed sit to/from stand with Emily, ambulated 300 ft with RW and Emily, WBAT, and ascended/descended 4 stairs with B handrails and supervision. Pt has been educated on seated and supine therex to be performed 3x/day at home to improve strength and ROM. Pt is cleared from inpatient physical therapy services at this time and home health physical therapy is recommended upon discharge from hospital.  Progression toward goals:  [x]      Goals met  []      Improving appropriately and progressing toward goals  []      Improving slowly and progressing toward goals  []      Not making progress toward goals and plan of care will be adjusted     PLAN:  Patient will be discharged from physical therapy at this time.   Rationale for discharge:  [x] Goals Achieved  [] [address]  [] Patient not participating in therapy  [] Other:  Discharge Recommendations:  Home Health  Further Equipment Recommendations for Discharge:  N/A     SUBJECTIVE:   Patient stated I was feeling nauseated this morning, but much better now.     OBJECTIVE DATA SUMMARY:   Critical Behavior:  Neurologic State: Alert, Appropriate for age  Orientation Level: Oriented X4  Cognition: Appropriate decision making, Appropriate for age attention/concentration, Appropriate safety awareness, Follows commands  Safety/Judgement: Awareness of environment  Functional Mobility Training:  Bed Mobility:  Supine to Sit: Modified independent  Sit to Supine: Modified independent  Transfers:  Sit to Stand: Modified independent  Stand to Sit: Modified independent  Balance:  Sitting: Intact  Standing: Intact; With support  Ambulation/Gait Training:  Distance (ft): 300 Feet (ft)  Assistive Device: Gait belt;Walker, rolling  Ambulation - Level of Assistance: Modified independent  Gait Abnormalities: Decreased step clearance; Antalgic  Left Side Weight Bearing: As tolerated  Base of Support: Shift to right  Stance: Left decreased  Speed/Eve: Slow  Step Length: Right shortened;Left shortened  Swing Pattern: Left asymmetrical;Right asymmetrical  Stairs:  Number of Stairs Trained: 4  Stairs - Level of Assistance: Supervision   Rail Use: Both  Pain:  Pain Scale 1: Numeric (0 - 10)  Pain Intensity 1: 1  Activity Tolerance:   Good  Please refer to the flowsheet for vital signs taken during this treatment.   After treatment:   [] Patient left in no apparent distress sitting up in chair  [] Patient left in no apparent distress in bed  [] Call bell left within reach  [] Nursing notified  [] Caregiver present  [] Bed alarm activated  Paty Reyna   Time Calculation: 25 mins

## 2018-03-23 NOTE — ROUTINE PROCESS
Bedside and Verbal shift change report given to Odalys Dunn RN (oncoming nurse) by Grayson Beltran RN (offgoing nurse). Report included the following information SBAR, Kardex and MAR.

## 2018-03-23 NOTE — PROGRESS NOTES
Progress Note        Patient: Carmina Farah MRN: [id number]  SSN: [id number]    YOB: 1960  Age: 59 y.o. Sex: female      2 Days Post-Op status post Procedure(s) (LRB):  LEFT TOTAL HIP ARTHROPLASTY  ANTERIOR APPROACH W/C-ARM (Left)    Admit Date: 3/21/2018  Admit Diagnosis: LEFT HIP OSTEOARTHRITIS  S/P total hip arthroplasty    Subjective:       Doing well. No complaints. No SOB. No Chest Pain. No Nausea or Vomiting. No problems eating or voiding. Objective:        Temp (24hrs), Av.6 °F (37 °C), Min:97.7 °F (36.5 °C), Max:99.7 °F (37.6 °C)    Body mass index is 32.96 kg/(m^2). Patient Vitals for the past 12 hrs:   BP Temp Pulse Resp SpO2   18 0701 134/65 99.7 °F (37.6 °C) 89 16 96 %   18 0329 106/52 98.6 °F (37 °C) 78 16 99 %   18 2246 94/49 98.3 °F (36.8 °C) 81 16 96 %     Recent Labs      18   0540   HGB  10.4*   HCT  32.4*   NA  147*   K  4.1   CL  110*   CO2  29   BUN  12   CREA  0.87   GLU  111*       Physical Exam:  Vital Signs are Stable. No Acute Distress. Alert and Oriented. Negative Homans sign. Toes AROM Full. Neurovascular exam is normal.    Dressing is Clean, Dry, and Intact. Assessment/Plan:     1. Patient doing well. Had slight hypotension but improved with fluid and has been asx. 2. Out of BED / PT / WBAT. Anterior Hip Precautions  3. DVT ppx: Mobilization, Ecotrin, RAEGAN hose, and mechanical compression  4. D/c planning; ISABEL drain output has still been high. Pt given option to go home with drain but wishes to stay until drain is pulled. Leave in place today and we will check ouptut tomorrow in AM and if improved will place DC orders electronically. DC scripts already printed and in chart. Continue PT/OT  Discharge Plan: Home with home health tomorrow if drain able to be pulled.      Signed By: Patria Jeronimo PA-C     2018

## 2018-03-23 NOTE — PROGRESS NOTES
1910 - Assumed care of patient at this time. Patient is alert and oriented x 4. Patient denies chest pain, shortness of breath, or numbness or tingling in the upper or lower extremities. Mepilex dressing on the left hip is clean, dry and intact. Sequential compression device and RAEGAN hose in place on the patient bilaterally. 18g IV in the left hand is patent when flushed with no signs of phlebitis or infiltration. Patient currently experiencing 0/10 pain. Bed is in lowest position with the wheels locked. Siderails x 3 are up. Call bell within reach. Patient is currently resting in bed in no apparent distress. Will continue to monitor. 1920 - Patient ambulated to the restroom and voided 600 cc of clear, yellow urine. Patient then returned to bed. Will continue to monitor. 1940 - Patient's blood pressure is currently 81/49 and 89/43 using the automatic vital signs machine. A manual blood pressure performed at this time was 88/46. The patient is not having any dizziness, lightheadedness, palpitations or shortness of breath at this time. Will contact the patient's physician. 2020 - Paged and spoke with Dr. Evin Plata concerning patient's low blood pressures. Received telephone orders with read back for a STAT H&H, 1000 mL bolus of normal saline, and to hold the night dose of losartan. Will continue to monitor. 2045 - 1000 mL 0.9 sodium chloride bolus started at this time. Lab draw completed at this time by lab staff. Will continue to monitor. 2053 - Head to toe assessment performed at this time. Patient has no complaints of chest pain or shortness of breath. Denies any numbness or tingling in extremities. Lungs are clear to auscultation. Bowel sounds are present in all 4 quadrants. Patient educated how to  actively manage their pain. Patient encouraged to use the incentive spirometer. Patient educated on the side effects of medications. Explained the importance of ambulation. Instructed the patient not to attempt to get out of bed and/or ambulate without a staff member present. Patient verbalized understanding. Patient left in bed with call light within reach, bed in lowest position with wheels locked, and siderails x 3 up. Will continue to monitor. 2145 - Fluid bolus complete at this time. Patient ambulated to the restroom and voided 500 cc of clear, yellow urine. Patient then returned to bed. Will continue to monitor. 2245 - Repeat blood pressure done at this time is 94/49. H&H results are 9.6/29.3. Will continue to monitor. 0330 - Repeat blood pressure done at this time is 106/52. Patient is not experiencing any dizziness, palpitations or shortness of breath. Will continue to monitor. 4443 - Patient ambulated to the restroom and voided 600 cc of clear, yellow urine. Patient then returned to bed. Will continue to monitor. Shift Summary - Patient had an uneventful night. Patient ambulated to the restroom several times. Patient did not require any PRN pain medication. Patient was able to rest quietly for the majority of the shift.

## 2018-03-23 NOTE — PROGRESS NOTES
Progress Note        Patient: Iglesia Paz MRN: [id number]  SSN: [id number]    YOB: 1960  Age: 59 y.o. Sex: female      2 Days Post-Op status post Procedure(s) (LRB):  LEFT TOTAL HIP ARTHROPLASTY  ANTERIOR APPROACH W/C-ARM (Left)    Admit Date: 3/21/2018  Admit Diagnosis: LEFT HIP OSTEOARTHRITIS  S/P total hip arthroplasty    Subjective:      Low BP overnight - improved with IVF. ISABEL with persistent output. Otherwise Doing well. No complaints. No SOB. No Chest Pain. No Nausea or Vomiting. No problems eating or voiding. Objective:        Temp (24hrs), Av.7 °F (37.1 °C), Min:98.2 °F (36.8 °C), Max:99.7 °F (37.6 °C)    Body mass index is 32.96 kg/(m^2). Patient Vitals for the past 12 hrs:   BP Temp Pulse Resp SpO2   18 1520 106/52 98.5 °F (36.9 °C) 82 16 100 %   18 1037 125/58 99.1 °F (37.3 °C) 79 16 99 %   18 0835 113/60 98.7 °F (37.1 °C) 81 16 98 %   18 0701 134/65 99.7 °F (37.6 °C) 89 16 96 %     Recent Labs      18   0540   HGB  10.4*   HCT  32.4*   NA  147*   K  4.1   CL  110*   CO2  29   BUN  12   CREA  0.87   GLU  111*       Physical Exam:  Vital Signs are Stable. No Acute Distress. Alert and Oriented. Negative Homans sign. Toes AROM Full. Neurovascular exam is normal.    Dressing is Clean, Dry, and Intact. ISABEL in place    Assessment/Plan:     1. Patient doing well. 2. Continue ISABEL. Depending on output overnight, might remove it tomorrow  3. Out of BED / PT / WBAT. Anterior Hip Precautions  4. DVT ppx: Mobilization, Ecotrin, RAEGAN hose, and mechanical compression  5. D/c planning     Continue PT/OT  Discharge Plan: Home with  once ISABEL can be removed.   Possibly tomorrow    Signed By: Bennie Guerrero MD     2018

## 2018-03-24 VITALS
HEIGHT: 66 IN | HEART RATE: 77 BPM | DIASTOLIC BLOOD PRESSURE: 64 MMHG | SYSTOLIC BLOOD PRESSURE: 121 MMHG | TEMPERATURE: 98.6 F | OXYGEN SATURATION: 97 % | RESPIRATION RATE: 16 BRPM | BODY MASS INDEX: 32.82 KG/M2 | WEIGHT: 204.2 LBS

## 2018-03-24 PROCEDURE — 74011250637 HC RX REV CODE- 250/637: Performed by: PHYSICIAN ASSISTANT

## 2018-03-24 RX ADMIN — ACETAMINOPHEN 1000 MG: 500 TABLET ORAL at 00:45

## 2018-03-24 RX ADMIN — DOCUSATE SODIUM 100 MG: 100 CAPSULE, LIQUID FILLED ORAL at 08:45

## 2018-03-24 RX ADMIN — FERROUS SULFATE TAB 325 MG (65 MG ELEMENTAL FE) 325 MG: 325 (65 FE) TAB at 08:45

## 2018-03-24 RX ADMIN — ACETAMINOPHEN 1000 MG: 500 TABLET ORAL at 07:00

## 2018-03-24 RX ADMIN — METOPROLOL SUCCINATE 50 MG: 50 TABLET, FILM COATED, EXTENDED RELEASE ORAL at 08:45

## 2018-03-24 RX ADMIN — ASPIRIN 81 MG 81 MG: 81 TABLET ORAL at 08:46

## 2018-03-24 NOTE — PROGRESS NOTES
1433  Bedside and Verbal shift change report given to Erwin Osborne RN, by Coleen Bailey RN. Report included the following information SBAR, Kardex, OR Summary, Intake/Output and MAR.

## 2018-03-24 NOTE — PROGRESS NOTES
Barry 35 care at this time. Assessment completed in flowsheet. Pt is alert and oriented x 4. Denies SOB and chest pain. Pt lungs clear bilaterally. Capillary refill less than 3 seconds. Pt denies numbness and tingling to all extremities. Stated pain  2/10. Pt has mepilex dressing with ISABEL drain. SCDs and TEDs applied  bilaterally. Pt encouraged to continue use of IS, Pt verbalized understanding. Ice pack applied. Call light and possessions within reach. Bed is in the lowest position. Will continue to monitor.     Friend at bedside    1015   d/c ISABEL drain clean, dry and intact  Review AVS with pt opportunities for questions and concerns

## 2018-03-24 NOTE — PROGRESS NOTES
1500  Assumed care at this time. Assessment completed in flowsheet. Pt is alert and oriented x 4. Denies SOB and chest pain. Pt lungs clear bilaterally. Capillary refill less than 3 seconds. Pt denies numbness and tingling to all extremities. Stated pain  2/10. Pt has mepilex dressing. SCDs and TEDs applied  bilaterally. Pt encouraged to continue use of IS, Pt verbalized understanding. Ice pack applied. Call light and possessions within reach. Bed is in the lowest position. Will continue to monitor.     Pt has cleared PT but Dr. Mary Barriga wanted to keep pt to make sure the output for the ISABEL drain decreases    1945  Emptied ISABEL drain 15ml

## 2018-03-24 NOTE — DISCHARGE SUMMARY
[address] [address]     DISCHARGE SUMMARY     PATIENT: Corie Guzman     MRN: [id number]   ADMIT DATE: 3/21/2018   [facility]   DISCHARGE DATE:  3-24-18     ATTENDING: Luisito Yanez MD   DICTATING: Jacek Campbell MD     ADMISSION DIAGNOSIS: LEFT HIP OSTEOARTHRITIS  S/P total hip arthroplasty    DISCHARGE DIAGNOSIS: Status post LEFT HIP OSTEOARTHRITIS     HISTORY OF PRESENT ILLNESS: The patient is a 59y.o. year-old female   with ongoing left hip pain secondary to osteoarthritis of left hip(s). The patient's pain has persisted and progressed despite conservative treatments and therapies. The patient has at this time opted for surgical intervention. PAST MEDICAL HISTORY:   Past Medical History:   Diagnosis Date    Arrhythmia     irregular     Arthritis     GERD (gastroesophageal reflux disease)     Hypertension     Liver disease     fatty liver       PAST SURGICAL HISTORY:   Past Surgical History:   Procedure Laterality Date    HX BACK SURGERY      HX BREAST LUMPECTOMY Right     HX CHOLECYSTECTOMY      HX HERNIA REPAIR      inguinal    HX HYSTERECTOMY      HX LUMBAR FUSION         ALLERGIES:   Allergies   Allergen Reactions    Pcn [Penicillins] Hives    Vicodin [Hydrocodone-Acetaminophen] Rash        CURRENT MEDICATIONS:  A list of medications prior to the time of admission include:  Prior to Admission medications    Medication Sig Start Date End Date Taking? Authorizing Provider   aspirin 81 mg chewable tablet Take 1 Tab by mouth two (2) times a day. 3/23/18  Yes Kashmir Lynch PA-C   oxyCODONE-acetaminophen (PERCOCET) 5-325 mg per tablet 1-2 tabs by mouth every 4-6 hours as needed for pain 3/23/18  Yes Kashmir Lynch PA-C   docusate sodium (COLACE) 50 mg capsule Take 2 Caps by mouth three (3) times daily as needed for Constipation for up to 90 days.  3/23/18 6/21/18 Yes Jack Linda Bravo Thomas PA-C   UBIDECARENONE/VITAMIN E MIXED (COQ10  PO) Take  by mouth daily. Historical Provider   Calcium-Cholecalciferol, D3, (CALCIUM 600 WITH VITAMIN D3) 600 mg(1,500mg) -400 unit cap Take  by mouth. Historical Provider   atorvastatin (LIPITOR) 10 mg tablet Take 10 mg by mouth nightly. Historical Provider   metoprolol succinate (TOPROL-XL) 50 mg XL tablet Take 50 mg by mouth daily. Fernando Melissa MD   losartan (COZAAR) 50 mg tablet Take 50 mg by mouth nightly. Fernando Melissa MD   multivitamin (ONE A DAY) tablet Take 1 Tab by mouth daily. Fernando Melissa MD   aspirin delayed-release 81 mg tablet Take 81 mg by mouth daily. Fernando Melissa MD       FAMILY HISTORY: No family history on file. SOCIAL HISTORY:   Social History     Social History    Marital status:      Spouse name: N/A    Number of children: N/A    Years of education: N/A     Social History Main Topics    Smoking status: Never Smoker    Smokeless tobacco: Never Used    Alcohol use Yes      Comment: one or two beers a year    Drug use: No    Sexual activity: Not Asked     Other Topics Concern    None     Social History Narrative       REVIEW OF SYSTEMS: All review of systems are negative. PHYSICAL EXAMINATION: For a detailed physical exam, please refer to the patient's chart. HOSPITAL COURSE: The patient was taken to surgery the day of admission. she underwent left total hip replacement(s) via the anterior approach. Operative course was benign. Estimated blood loss approximately 600 mL. The patient was taken to the PACU in stable condition and was later taken to the floor in stable condition. She was hypotensive requiring IVF hydration overnight between POD#1 and POD#2. She had  ISABEL drain in place from the OR which had persistent output POD#1 and POD#2, but which significantly decreased by POD#3. Post-op Day #3, patient has done very well.  she has had little to no pain.   she had been cleared by physical therapy with stair training. she was placed on Aspirin for DVT prophylaxis. her vitals have remained stable. she has also remained hemodynamically stable. The patient has been recommended for discharge home with Home Health. DISCHARGE INSTRUCTIONS: The patient is to be discharged home with Home Health. she is to continue on her prior medications per the medication reconciliation form, to which we will add:  1. Aspirin 81 mg; 1 tablet p.o. Twice daily for 6 weeks  2. Colace 100 mg by mouth 3 times daily as needed for constipation  3. Percocet 5/325 mg; 1-2 tablets p.o. every 4 to 6 hours as needed for pain    The patient is to continue at home with home physical therapy 3 times a week to work on gait training, range of motion, strengthening, and weightbearing exercises as tolerated on her left lower extremity(ies). The patient is to progress from a walker to a cane to complete total weightbearing as tolerable. The patient is to continue to keep her incision dry. The patient is to followup with Dr. Viry Kidd in the office in 1-2 weeks status post for x-rays and further evaluation.       Nirmal Aguilar MD  1/09/93965:61 AM

## 2018-03-24 NOTE — ROUTINE PROCESS
Bedside and Verbal shift change report given to MURTAZA Rm RN by Mignon Ho RN. Report included the following information SBAR, Kardex, OR Summary, Intake/Output and MAR.

## 2018-03-24 NOTE — PROGRESS NOTES
1945  Bedside and Verbal shift change report given to CARMELITA Kaiser RN by Sara Ferreira RN. Report included the following information SBAR, Kardex, OR Summary, Intake/Output and MAR.

## 2018-03-24 NOTE — PROGRESS NOTES
Problem: Falls - Risk of  Goal: *Absence of Falls  Document Quentin Fall Risk and appropriate interventions in the flowsheet.    Outcome: Progressing Towards Goal  Fall Risk Interventions:  Mobility Interventions: Utilize walker, cane, or other assitive device, Patient to call before getting OOB    Mentation Interventions: Adequate sleep, hydration, pain control    Medication Interventions: Patient to call before getting OOB, Teach patient to arise slowly    Elimination Interventions: Call light in reach, Elevated toilet seat, Patient to call for help with toileting needs

## 2018-03-24 NOTE — DISCHARGE INSTRUCTIONS
[facility]    Patient Discharge Instructions    Arti Cruz / [id number] : 1960    Admitted 3/21/2018 Discharged: 3/24/2018     IF YOU HAVE ANY PROBLEMS ONCE YOU ARE AT HOME CALL THE FOLLOWING NUMBERS:   Main office number: [phone]    Your follow up appointment to see Dr. Stephanie Sandoval is scheduled in 1-2 weeks. If you are unsure of your appointment date call the office at [phone]. Take Home Medications     · Resume your home medictions as directed  · A prescription for pain medication has been given   · It is important that you take the medication exactly as they are prescribed. · Keep your medication in the bottles provided by the pharmacist and keep a list of the medication names, dosages, and times to be taken in your wallet. · Do not take other medications without consulting your doctor. · Note:  If you have already received and/or filled a prescription for one or more of the medications you've received a prescription for when leaving the hospital, you may disregard the duplicate prescription. What to do at [address] [place] [address] Ave your prehospital diet. If you have excessive nausea or vomitting call your doctor's office    Wear portable sequential compressive devices at least 18 hours per day for 2 weeks. They may be used beyond 2 weeks as needed to help control swelling. RAEGAN hose should be worn during the day - may be removed for sleep. Should be worn on both legs for 2 weeks and then on the operative extremity for a total of 6 weeks. Begin In-Home Physical Therapy; 3 times a week to work on gait training, range of motion, strengthening, and weight bearing exercises as tolerable. Continue to use your walker or cane when walking; may progress from the walker to a cane to complete total bearing as tolerable. Keep incision DRY. You may need to sponge bathe to avoid getting the incision wet.       When to Call    - Call if you have a temperature greater then 101  - Unable to keep food down  - Are unable to bear any wieght   - Need a pain medication refill     Information obtained by :  I understand that if any problems occur once I am at home I am to contact my physician. I understand and acknowledge receipt of the instructions indicated above.                                                                                                                                            Physician's or R.N.'s Signature                                                                  Date/Time                                                                                                                                              Patient or Representative Signature                                                          Date/Time

## 2018-03-25 ENCOUNTER — HOME CARE VISIT (OUTPATIENT)
Dept: SCHEDULING | Facility: HOME HEALTH | Age: 58
End: 2018-03-25

## 2018-03-25 VITALS
TEMPERATURE: 98.6 F | RESPIRATION RATE: 18 BRPM | DIASTOLIC BLOOD PRESSURE: 76 MMHG | OXYGEN SATURATION: 99 % | SYSTOLIC BLOOD PRESSURE: 118 MMHG | HEART RATE: 78 BPM

## 2018-03-25 VITALS
HEART RATE: 76 BPM | TEMPERATURE: 99.1 F | SYSTOLIC BLOOD PRESSURE: 118 MMHG | DIASTOLIC BLOOD PRESSURE: 68 MMHG | OXYGEN SATURATION: 97 %

## 2018-03-25 PROCEDURE — G0151 HHCP-SERV OF PT,EA 15 MIN: HCPCS

## 2018-03-27 ENCOUNTER — HOME CARE VISIT (OUTPATIENT)
Dept: SCHEDULING | Facility: HOME HEALTH | Age: 58
End: 2018-03-27
Payer: COMMERCIAL

## 2018-03-27 PROCEDURE — G0157 HHC PT ASSISTANT EA 15: HCPCS

## 2018-03-27 PROCEDURE — G0300 HHS/HOSPICE OF LPN EA 15 MIN: HCPCS

## 2018-03-28 VITALS
OXYGEN SATURATION: 96 % | HEART RATE: 74 BPM | TEMPERATURE: 99.8 F | DIASTOLIC BLOOD PRESSURE: 70 MMHG | SYSTOLIC BLOOD PRESSURE: 116 MMHG

## 2018-03-29 ENCOUNTER — HOME CARE VISIT (OUTPATIENT)
Dept: SCHEDULING | Facility: HOME HEALTH | Age: 58
End: 2018-03-29
Payer: COMMERCIAL

## 2018-03-29 PROCEDURE — G0152 HHCP-SERV OF OT,EA 15 MIN: HCPCS

## 2018-03-29 PROCEDURE — G0157 HHC PT ASSISTANT EA 15: HCPCS

## 2018-03-30 ENCOUNTER — HOME CARE VISIT (OUTPATIENT)
Dept: SCHEDULING | Facility: HOME HEALTH | Age: 58
End: 2018-03-30
Payer: COMMERCIAL

## 2018-03-30 ENCOUNTER — HOME CARE VISIT (OUTPATIENT)
Dept: HOME HEALTH SERVICES | Facility: HOME HEALTH | Age: 58
End: 2018-03-30

## 2018-03-30 VITALS
HEART RATE: 84 BPM | SYSTOLIC BLOOD PRESSURE: 93 MMHG | DIASTOLIC BLOOD PRESSURE: 62 MMHG | OXYGEN SATURATION: 97 % | TEMPERATURE: 98.8 F

## 2018-03-30 VITALS
HEART RATE: 89 BPM | DIASTOLIC BLOOD PRESSURE: 64 MMHG | TEMPERATURE: 99.5 F | OXYGEN SATURATION: 96 % | SYSTOLIC BLOOD PRESSURE: 109 MMHG

## 2018-03-30 PROCEDURE — G0157 HHC PT ASSISTANT EA 15: HCPCS

## 2018-04-01 VITALS
OXYGEN SATURATION: 98 % | TEMPERATURE: 98.6 F | SYSTOLIC BLOOD PRESSURE: 110 MMHG | DIASTOLIC BLOOD PRESSURE: 65 MMHG | HEART RATE: 78 BPM

## 2018-04-02 ENCOUNTER — HOME CARE VISIT (OUTPATIENT)
Dept: SCHEDULING | Facility: HOME HEALTH | Age: 58
End: 2018-04-02
Payer: COMMERCIAL

## 2018-04-02 VITALS
HEART RATE: 80 BPM | TEMPERATURE: 99 F | SYSTOLIC BLOOD PRESSURE: 117 MMHG | DIASTOLIC BLOOD PRESSURE: 74 MMHG | OXYGEN SATURATION: 98 % | RESPIRATION RATE: 18 BRPM

## 2018-04-02 VITALS
HEART RATE: 80 BPM | DIASTOLIC BLOOD PRESSURE: 65 MMHG | TEMPERATURE: 98.8 F | SYSTOLIC BLOOD PRESSURE: 105 MMHG | OXYGEN SATURATION: 96 %

## 2018-04-02 PROCEDURE — G0300 HHS/HOSPICE OF LPN EA 15 MIN: HCPCS

## 2018-04-02 PROCEDURE — G0157 HHC PT ASSISTANT EA 15: HCPCS

## 2018-04-03 ENCOUNTER — HOME CARE VISIT (OUTPATIENT)
Dept: SCHEDULING | Facility: HOME HEALTH | Age: 58
End: 2018-04-03
Payer: COMMERCIAL

## 2018-04-03 PROCEDURE — G0152 HHCP-SERV OF OT,EA 15 MIN: HCPCS

## 2018-04-04 ENCOUNTER — HOME CARE VISIT (OUTPATIENT)
Dept: SCHEDULING | Facility: HOME HEALTH | Age: 58
End: 2018-04-04
Payer: COMMERCIAL

## 2018-04-04 VITALS
DIASTOLIC BLOOD PRESSURE: 57 MMHG | SYSTOLIC BLOOD PRESSURE: 94 MMHG | OXYGEN SATURATION: 97 % | HEART RATE: 86 BPM | TEMPERATURE: 98.2 F

## 2018-04-04 VITALS
OXYGEN SATURATION: 97 % | SYSTOLIC BLOOD PRESSURE: 120 MMHG | TEMPERATURE: 99.1 F | HEART RATE: 85 BPM | DIASTOLIC BLOOD PRESSURE: 75 MMHG

## 2018-04-04 PROCEDURE — G0157 HHC PT ASSISTANT EA 15: HCPCS

## 2018-04-06 ENCOUNTER — HOME CARE VISIT (OUTPATIENT)
Dept: HOME HEALTH SERVICES | Facility: HOME HEALTH | Age: 58
End: 2018-04-06

## 2018-04-06 ENCOUNTER — HOME CARE VISIT (OUTPATIENT)
Dept: SCHEDULING | Facility: HOME HEALTH | Age: 58
End: 2018-04-06
Payer: COMMERCIAL

## 2018-04-06 ENCOUNTER — HOME CARE VISIT (OUTPATIENT)
Dept: SCHEDULING | Facility: HOME HEALTH | Age: 58
End: 2018-04-06

## 2018-04-06 PROCEDURE — G0157 HHC PT ASSISTANT EA 15: HCPCS

## 2018-04-06 PROCEDURE — G0152 HHCP-SERV OF OT,EA 15 MIN: HCPCS

## 2018-04-07 VITALS
HEART RATE: 80 BPM | DIASTOLIC BLOOD PRESSURE: 60 MMHG | SYSTOLIC BLOOD PRESSURE: 110 MMHG | TEMPERATURE: 97.5 F | OXYGEN SATURATION: 96 %

## 2018-04-07 VITALS
TEMPERATURE: 98.7 F | SYSTOLIC BLOOD PRESSURE: 93 MMHG | HEART RATE: 85 BPM | OXYGEN SATURATION: 96 % | DIASTOLIC BLOOD PRESSURE: 63 MMHG

## 2018-04-08 VITALS
OXYGEN SATURATION: 98 % | HEART RATE: 77 BPM | DIASTOLIC BLOOD PRESSURE: 73 MMHG | SYSTOLIC BLOOD PRESSURE: 118 MMHG | RESPIRATION RATE: 16 BRPM | TEMPERATURE: 98.8 F

## 2019-11-08 RX ORDER — DIPHENHYDRAMINE HYDROCHLORIDE 50 MG/ML
50 INJECTION, SOLUTION INTRAMUSCULAR; INTRAVENOUS ONCE
Status: CANCELLED | OUTPATIENT
Start: 2019-11-08 | End: 2019-11-08

## 2019-11-08 RX ORDER — EPINEPHRINE 0.1 MG/ML
1 INJECTION INTRACARDIAC; INTRAVENOUS
Status: CANCELLED | OUTPATIENT
Start: 2019-11-08 | End: 2019-11-09

## 2019-11-08 RX ORDER — DEXTROMETHORPHAN/PSEUDOEPHED 2.5-7.5/.8
1.2 DROPS ORAL
Status: CANCELLED | OUTPATIENT
Start: 2019-11-08

## 2019-11-08 RX ORDER — SODIUM CHLORIDE 0.9 % (FLUSH) 0.9 %
5-40 SYRINGE (ML) INJECTION EVERY 8 HOURS
Status: CANCELLED | OUTPATIENT
Start: 2019-11-08

## 2019-11-08 RX ORDER — ATROPINE SULFATE 0.1 MG/ML
0.5 INJECTION INTRAVENOUS
Status: CANCELLED | OUTPATIENT
Start: 2019-11-08 | End: 2019-11-09

## 2019-11-08 RX ORDER — FLUMAZENIL 0.1 MG/ML
0.2 INJECTION INTRAVENOUS
Status: CANCELLED | OUTPATIENT
Start: 2019-11-08 | End: 2019-11-08

## 2019-11-08 RX ORDER — SODIUM CHLORIDE 0.9 % (FLUSH) 0.9 %
5-40 SYRINGE (ML) INJECTION AS NEEDED
Status: CANCELLED | OUTPATIENT
Start: 2019-11-08

## 2019-11-11 ENCOUNTER — HOSPITAL ENCOUNTER (OUTPATIENT)
Age: 59
Setting detail: OUTPATIENT SURGERY
Discharge: HOME | End: 2019-11-11
Attending: INTERNAL MEDICINE | Admitting: INTERNAL MEDICINE
Payer: COMMERCIAL

## 2019-11-11 VITALS
BODY MASS INDEX: 32.01 KG/M2 | OXYGEN SATURATION: 96 % | HEIGHT: 66 IN | DIASTOLIC BLOOD PRESSURE: 66 MMHG | SYSTOLIC BLOOD PRESSURE: 115 MMHG | TEMPERATURE: 98 F | RESPIRATION RATE: 16 BRPM | WEIGHT: 199.19 LBS | HEART RATE: 88 BPM

## 2019-11-11 PROCEDURE — 76040000019: Performed by: INTERNAL MEDICINE

## 2019-11-11 PROCEDURE — G0500 MOD SEDAT ENDO SERVICE >5YRS: HCPCS | Performed by: INTERNAL MEDICINE

## 2019-11-11 PROCEDURE — 74011250636 HC RX REV CODE- 250/636: Performed by: INTERNAL MEDICINE

## 2019-11-11 PROCEDURE — 77030040361 HC SLV COMPR DVT MDII -B: Performed by: INTERNAL MEDICINE

## 2019-11-11 PROCEDURE — 77030020256 HC SOL INJ NACL 0.9%  500ML: Performed by: INTERNAL MEDICINE

## 2019-11-11 RX ORDER — CALCIUM CARBONATE 750 MG/1
1 TABLET, CHEWABLE ORAL AS NEEDED
COMMUNITY

## 2019-11-11 RX ORDER — FENTANYL CITRATE 50 UG/ML
100 INJECTION, SOLUTION INTRAMUSCULAR; INTRAVENOUS
Status: DISCONTINUED | OUTPATIENT
Start: 2019-11-11 | End: 2019-11-11 | Stop reason: HOSPADM

## 2019-11-11 RX ORDER — SODIUM CHLORIDE 9 MG/ML
1000 INJECTION, SOLUTION INTRAVENOUS CONTINUOUS
Status: DISCONTINUED | OUTPATIENT
Start: 2019-11-11 | End: 2019-11-11 | Stop reason: HOSPADM

## 2019-11-11 RX ORDER — MIDAZOLAM HYDROCHLORIDE 1 MG/ML
.25-5 INJECTION, SOLUTION INTRAMUSCULAR; INTRAVENOUS
Status: DISCONTINUED | OUTPATIENT
Start: 2019-11-11 | End: 2019-11-11 | Stop reason: HOSPADM

## 2019-11-11 RX ORDER — NALOXONE HYDROCHLORIDE 0.4 MG/ML
0.4 INJECTION, SOLUTION INTRAMUSCULAR; INTRAVENOUS; SUBCUTANEOUS
Status: DISCONTINUED | OUTPATIENT
Start: 2019-11-11 | End: 2019-11-11 | Stop reason: HOSPADM

## 2019-11-11 RX ADMIN — SODIUM CHLORIDE 1000 ML: 900 INJECTION, SOLUTION INTRAVENOUS at 10:02

## 2019-11-11 NOTE — H&P
Assessment/Plan  # Detail Type Description    1. Assessment Encounter for screening colonoscopy (Z12.11). Patient Plan  59year old  female  patient of Dr. Ana Aparicio for colonoscopy. Last colonoscopy completed  4/27/09  by Dr. Sumanth Castillo and pathology revealed Normal colon to the cecum and internal hemorrhoids. BM once daily. Normal color, soft, formed in consistency. No evidence of blood or mucus, changes in bowel pattern or constipation issues. Patient reports PCN, Vicodin  allergies but no herbal consumption. Medical hx includes HTN, hyperlipidemia, No significant cardiac, pulmonary, GI, , musculoskeletal, or endocrine issues. Surgical hx remarkable for left hip arthroplasty, right inguinal hernia, hysterectomy, cholecystectomy,, L5-S1 fusion, No family history of colorectal CA. Denies tobacco but reports occasional  ETOH use. No significant weight gains or losses in the last 3-6 months. No heat or cold intolerances. Patient states  no N/V/D, fever, chills, sick contacts, SOB, abdominal or chest pains. No dysphagia, appetite is good which consists of 3 meals per day. PLAN: Colonoscopy Scheduled    She has average risk for colon cancer and is asymptomatic. She would be having her screening colonoscopy. I explained to her the procedure of colonoscopy and the risks involved which include but not limited to reaction to sedation, bleeding, perforation, infection or missing a lesion if bowels are not well prepped or are unusually tortuous. She agreed to proceed with the procedure and answered her questions. I gave her the- approved prep  to clean her bowels. I advised her to take if needed extra laxatives for few days before in case she is on the constipated side to assure adequate bowel prep. Told her not take her medications in the morning of the procedure because they would be flushed out with the prep but can take them more confidently after the procedure.  I advised her to bring all her medication with her. This 59year old female presents for Colon Cancer Screening. History of Present Illness:  1. Colon Cancer Screening   Prior screening:  colonoscopy. Denies risk factors. Pertinent negatives include abdominal pain, change in bowel habits, change in stool caliber, constipation, decreased appetite, diarrhea, melena, nausea, rectal bleeding, vomiting, weight gain and weight loss. Additional information: No family history of colon cancer, No family history of Crohn's/colitis and No NSAID/ASA use. PROBLEM LIST:   Problem List reviewed. Problem Description Onset Date Chronic Clinical Status Notes   Derangement of medial meniscus 2015 N     Osteoarthritis of knee 2015 N     Gastro-esophageal reflux disease with esophagitis 2010 Y     Pure hypercholesterolemia 2010 Y     Migraine 2010 Y     Vitamin D deficiency 2010 Y     Midline cystocele 2010 Y     Conduction disorder of the heart 05/15/2014 Y               PAST MEDICAL/SURGICAL HISTORY   (Detailed)    Disease/disorder Onset Date Management Date Comments    10/06/2011 Cholecystectomy       Hip replacement 2018      Breast biopsy       Hysterectomy       Hernia repair       Back surgery           Family History  (Detailed)  Relationship Family Member Name  Age at Death Condition Onset Age Cause of Death   Father    Coronary artery disease  N   Mother    Hypertension  N   Sister    Diabetes mellitus  N   Sister    Hypertension  N         Social History:  (Detailed)  Tobacco use reviewed. The patient is right-handed. Preferred language is [place]. MARITAL STATUS/FAMILY/SOCIAL SUPPORT  Currently . Smoking status: Never smoker. SMOKING STATUS  Use Status Type Smoking Status Usage Per Day Years Used Total Pack Years   no/never  Never smoker        ALCOHOL  There is a history of alcohol use. Type: Wine. consumed socially.   CAFFEINE  The patient uses caffeine: coffee - 1 cup a day. Medications (active prior to today)  Medication Instructions Start Date Stop Date Refilled Elsewhere   Aspir-Low 81 mg tablet,delayed release take 1 tablet by oral route  every day 11/24/2015   N   Vitamin D3 2,000 unit capsule  12/18/2015   N   multivitamin capsule take 1 capsule by oral route  every day 12/18/2015   N   Toprol XL 50 mg tablet,extended release take 1 tablet by oral route  every day //   Y   losartan 50 mg tablet take 1 tablet by oral route  every day 12/26/2016   N   Caltrate 600 + D  //   Y   Co Q-10  //   Y   atorvastatin 10 mg tablet take 1 tablet by oral route  every day 06/17/2019 06/17/2019 N     Patient Status   Completed with information received for patient in a summary of care record. Medication Reconciliation  Medications reconciled today.   Medication Reviewed  Adherence Medication Name Sig Desc Elsewhere Status   taking as directed Co Q-10  Y Verified   taking as directed losartan 50 mg tablet take 1 tablet by oral route  every day N Verified   taking as directed atorvastatin 10 mg tablet take 1 tablet by oral route  every day N Verified   taking as directed Caltrate 600 + D  Y Verified   taking as directed multivitamin capsule take 1 capsule by oral route  every day N Verified   taking as directed Vitamin D3 2,000 unit capsule  N Verified   taking as directed Toprol XL 50 mg tablet,extended release take 1 tablet by oral route  every day Y Verified   taking as directed Aspir-Low 81 mg tablet,delayed release take 1 tablet by oral route  every day N Verified     Medications (Added, Continued or Stopped today)  Start Date Medication Directions PRN Status PRN Reason Instruction Stop Date   11/24/2015 Aspir-Low 81 mg tablet,delayed release take 1 tablet by oral route  every day N      06/17/2019 atorvastatin 10 mg tablet take 1 tablet by oral route  every day N       Caltrate 600 + D  N       Co Q-10  N      12/26/2016 losartan 50 mg tablet take 1 tablet by oral route  every day N      12/18/2015 multivitamin capsule take 1 capsule by oral route  every day N       Toprol XL 50 mg tablet,extended release take 1 tablet by oral route  every day N      12/18/2015 Vitamin D3 2,000 unit capsule  N        Allergies:  Ingredient Reaction (Severity) Medication Name Comment   ACETAMINOPHEN  Vicodin    CODEINE      HYDROCODONE BITARTRATE (unknown) Vicodin    NITROFURANTOIN headache Macrobid    NITROFURANTOIN MACROCRYSTALLINE headache Macrobid    PENICILLINS (unknown)     Reviewed, no changes. Review of Systems  System Neg/Pos Details   Constitutional Negative Chills, Fever, Malaise, Weight gain and Weight loss. ENMT Negative Ear infections, Nasal congestion, Sinus Infection and Sore throat. Eyes Negative Double vision and Eye pain. Respiratory Negative Asthma, Chronic cough, Dyspnea, Pleuritic pain and Wheezing. Cardio Negative Chest pain, Edema and Irregular heartbeat/palpitations. GI Negative Abdominal pain, Change in bowel habits, Change in stool caliber, Constipation, Decreased appetite, Diarrhea, Dysphagia, Heartburn, Hematemesis, Hematochezia, Melena, Nausea, Rectal bleeding, Reflux and Vomiting.  Negative Dysuria, Hematuria, Urinary frequency, Urinary incontinence and Urinary retention. Endocrine Negative Cold intolerance, Heat intolerance and Increased thirst.   Neuro Negative Dizziness, Headache, Numbness, Tremors and Vertigo. Psych Negative Anxiety, Depression and Increased stress. Integumentary Negative Hives, Pruritus and Rash. MS Negative Back pain, Joint pain and Myalgia. Hema/Lymph Negative Easy bleeding, Easy bruising and Lymphadenopathy. Allergic/Immuno Positive Seasonal allergies. Allergic/Immuno Negative Chemicals in work place, Contact allergy, Food allergies and Immunosuppression. Reproductive Negative Breast lumps, Breast pain and Vaginal discharge.    Vital Signs     Height  Time ft in cm Last Measured Height Position   9:40 AM 5.0 6.00 167.64 09/12/2019 0     Date/Time Temp Pulse BP MAP (Calculated) Arterial Line 1 BP (mmHg) BP Patient Position Resp SpO2 O2 Device O2 Flow Rate (L/min) Pre/Post Ductal Weight   11/11/19 0935 98.6 °F (37 °C) 94 142/70 94   16 100 % Room air   90.4 kg (199 lb 3 oz)       PHYSICAL EXAM:  Exam Findings Details   Constitutional Normal Well developed. Eyes Normal Conjunctiva - Right: Normal, Left: Normal. Sclera - Right: Normal, Left: Normal.   Nasopharynx Normal Lips/teeth/gums - Normal. Buccal mucosa - Normal.   Neck Exam Normal Inspection - Normal. Palpation - Normal. Thyroid gland - Normal.   Respiratory Normal Inspection - Normal. Auscultation - Normal.   Cardiovascular Normal Regular rate and rhythm. No murmurs, gallops, or rubs. Vascular Normal Pulses - Radial: Normal, Brachial: Normal, Dorsalis pedis: Normal, Posterior tibial: Normal.   Abdomen Normal Inspection - Normal. Appliance(s) - Normal. Abdominal muscles - Normal. Auscultation - Normal. Percussion - Normal. Anterior palpation - Normal, No guarding. Umbilicus - Normal. No abdominal tenderness. No hepatic enlargement. No spleen enlargement. No hernia. No Ascites. Jarrett's sign - Negative. No hepatic tenderness. No hepatic bruit. Skin Normal Inspection - Normal.   Musculoskeletal Normal Hands/Wrist - Right: Normal, Left: Normal.   Extremity Normal No edema. Neurological Normal Fine motor skills - Normal.   Psychiatric Normal Orientation - Oriented to time, place, person & situation. Appropriate mood and affect.      No change in H&P

## 2019-11-11 NOTE — DISCHARGE INSTRUCTIONS
Morena Faria  [id number]  1960    COLON DISCHARGE INSTRUCTIONS    Discomfort:  Redness at IV site- apply warm compress to area; if redness or soreness persist- contact your physician  There may be a slight amount of blood passed from the rectum  Gaseous discomfort- walking, belching will help relieve any discomfort  You may not operate a vehicle til the next day. You may not engage in an occupation involving machinery or appliances til the next day. You may not drink alcoholic beverages til the next day. DIET:   High fiber diet. ACTIVITY:  You may not  resume your normal daily activities til the next day. it is recommended that you spend the remainder of the day resting -  avoid any strenuous activity. CALL M.D.  IF ANY SIGN OF:   Increasing pain, nausea, vomiting  Abdominal distension (swelling)  New increased bleeding (oral or rectal)  Fever (chills)  Pain in chest area  Bloody discharge from nose or mouth  Shortness of breath    You may  take any Advil, Aspirin, Ibuprofen, Motrin, Aleve, or Goodys ONLY  Tylenol as needed for pain. Post procedure diagnosis:  SKIN TAG; INTERNAL AND EXTERNAL HEMORRHOIDS; WEAK ANAL SPHINCTER TONE; TORTUOUS COLON;     Follow-up Instructions: Your follow up colonoscopy will be in 10 years. Tiff Beyer MD  November 11, 2019     DISCHARGE SUMMARY from Nurse    PATIENT INSTRUCTIONS:    After general anesthesia or intravenous sedation, for 24 hours or while taking prescription Narcotics:  · Limit your activities  · Do not drive and operate hazardous machinery  · Do not make important personal or business decisions  · Do  not drink alcoholic beverages  · If you have not urinated within 8 hours after discharge, please contact your surgeon on call.     Report the following to your surgeon:  · Excessive pain, swelling, redness or odor of or around the surgical area  · Temperature over 100.5  · Nausea and vomiting lasting longer than 4 hours or if unable to take medications  · Any signs of decreased circulation or nerve impairment to extremity: change in color, persistent  numbness, tingling, coldness or increase pain  · Any questions    What to do at Home:  Recommended activity: as above    If you experience any of the following symptoms as above, please follow up with Dr. Liya Ritetr. *  Please give a list of your current medications to your Primary Care Provider. *  Please update this list whenever your medications are discontinued, doses are      changed, or new medications (including over-the-counter products) are added. *  Please carry medication information at all times in case of emergency situations. These are general instructions for a healthy lifestyle:    No smoking/ No tobacco products/ Avoid exposure to second hand smoke  Surgeon General's Warning:  Quitting smoking now greatly reduces serious risk to your health. Obesity, smoking, and sedentary lifestyle greatly increases your risk for illness    A healthy diet, regular physical exercise & weight monitoring are important for maintaining a healthy lifestyle    You may be retaining fluid if you have a history of heart failure or if you experience any of the following symptoms:  Weight gain of 3 pounds or more overnight or 5 pounds in a week, increased swelling in our hands or feet or shortness of breath while lying flat in bed. Please call your doctor as soon as you notice any of these symptoms; do not wait until your next office visit. The discharge information has been reviewed with the patient and caregiver. The patient and caregiver verbalized understanding. Discharge medications reviewed with the patient and caregiver and appropriate educational materials and side effects teaching were provided.   Patient armband removed and shredded    ___________________________________________________________________________________________________________________________________

## 2019-11-11 NOTE — PROCEDURES
[facility]  Colonoscopy Procedure Report  _______________________________________________________  Patient: Nola Irene                                         Attending Physician: Claudia Mixon MD    Patient ID: [id number]                                      Referring Physician: Rock Linton DO    Exam Date: November 11, 2019 _______________________________________________________      Introduction: A  59 y.o. female patient, presents for outpatient Colonoscopy    Indications: patient of Dr. Lucio Lou for colonoscopy. Last colonoscopy completed  4/27/09  by Dr. Sanjeev Montgomery: internal hemorrhoids. BM every 2 day . Medical hx includes HTN, hyperlipidemia, Surgical hx remarkable for left hip arthroplasty, right inguinal hernia, hysterectomy, cholecystectomy,, L5-S1 fusion, No family history of colorectal CA. Consent: The benefits, risks, and alternatives to the procedure were discussed and informed consent was obtained from the patient. Preparation: EKG, pulse, pulse oximetry and blood pressure were monitored throughout the procedure. ASA Classification: Class 2 - . The heart is an S1-S2 and regular heart rate and rhythm. Lungs are clear to auscultation and percussion. Abdomen is soft, nondistended, and nontender. Mental Status: awake, alert, and oriented to person, place, and time    Medications:  · Fentanyl 100 mcg IV before procedure. · Versed 5 mg IV throughout the procedure. Rectal Exam: Normal Rectal Exam. No Blood. Pathology Specimens: No specimens removed. Procedure: The colonoscope was passed with difficulty through the anus under direct visualization and advanced to the cecum and 5 cm inside the terminal ileum. The patient required positioning on the back with some external counterpressure to aid in the passage of the scope. The scope was withdrawn and the mucosa was carefully examined. The quality of the preparation was excellent. The views were excellent.  The patient's toleration of the procedure was very good. Retroflexion was preformed in the ascending colon and hepatic flexure. The exam was done twice to the cecum. Total time is 15 minutes and withdrawal time is 9 minutes. Findings:    Rectum: Moderate internal hemorrhoids  Sigmoid:   Tortuous sigmoid  Descending Colon:   Normal  Transverse Colon:   Normal  Ascending Colon:   tortuous hepatic flexure  Cecum:   Normal  Terminal Ileum:   Normal      Unplanned Events: There were no unplanned events. Estimated Blood Loss: None  Impressions: Moderate external and internal hemorrhoids. Tortuous sigmoid. Tortuous hepatic flexure otherwise normal colon with Normal Mucosa. No diverticula or polyps found. Complications: None; patient tolerated the procedure well. Recommendations:  · Discharge home when standard parameters are met. · Resume a high fiber diet. · Colonoscopy recommendation in 10 years.     Procedure Codes:    · COLONOSCOPY [[id number] (Type: Custom)]    Endoscope Information:  Model Number(s)    [id number]   Assistant: None  Signed By: Noni Sepulveda MD Date: November 11, 2019

## 2022-01-17 NOTE — PROGRESS NOTES
Patient called stating she was seen in the [facility] system over the weekend for a rash. She stated it started on her thigh on Thur and quickly spread to her upper body.  She stated she thought it was shingles so she did a tele health visit and it was determined to not be but she was given prednisone to take as a course of treatment.  She stated this rash has since become painful and itchy.  She stated it is on her face, nose and mouth but not in her mouth.  Patient stated she did have a temp of 100.1 but that has subsided.  She stated she has had on/off chills.  She stated she was tested for Covid and it came back negative.  Patient was \"less than pleased\" with her experience with [facility] and would like to establish with an [facility] provider.  PSR advised a triage nurse would have to contact her prior to an appt being made and she was OK with this.   Problem: Falls - Risk of  Goal: *Absence of Falls  Document Quentin Fall Risk and appropriate interventions in the flowsheet.    Outcome: Progressing Towards Goal  Fall Risk Interventions:  Mobility Interventions: Patient to call before getting OOB, Utilize walker, cane, or other assitive device    Mentation Interventions: Adequate sleep, hydration, pain control    Medication Interventions: Patient to call before getting OOB, Teach patient to arise slowly    Elimination Interventions: Call light in reach, Patient to call for help with toileting needs, Toileting schedule/hourly rounds

## 2022-03-19 PROBLEM — Z96.649 S/P TOTAL HIP ARTHROPLASTY: Status: ACTIVE | Noted: 2018-03-21

## 2025-02-05 ENCOUNTER — HOSPITAL ENCOUNTER (OUTPATIENT)
Facility: HOSPITAL | Age: 65
Setting detail: OUTPATIENT SURGERY
Discharge: HOME | End: 2025-02-05
Attending: INTERNAL MEDICINE | Admitting: INTERNAL MEDICINE
Payer: MEDICARE

## 2025-02-05 VITALS
HEIGHT: 67 IN | DIASTOLIC BLOOD PRESSURE: 48 MMHG | RESPIRATION RATE: 18 BRPM | HEART RATE: 66 BPM | WEIGHT: 212.3 LBS | TEMPERATURE: 98.7 F | SYSTOLIC BLOOD PRESSURE: 92 MMHG | OXYGEN SATURATION: 94 % | BODY MASS INDEX: 33.32 KG/M2

## 2025-02-05 PROCEDURE — 7100000010 HC PHASE II RECOVERY - FIRST 15 MIN: Performed by: INTERNAL MEDICINE

## 2025-02-05 PROCEDURE — 7100000011 HC PHASE II RECOVERY - ADDTL 15 MIN: Performed by: INTERNAL MEDICINE

## 2025-02-05 PROCEDURE — 3600007512: Performed by: INTERNAL MEDICINE

## 2025-02-05 PROCEDURE — 2709999900 HC NON-CHARGEABLE SUPPLY: Performed by: INTERNAL MEDICINE

## 2025-02-05 PROCEDURE — 3600007502: Performed by: INTERNAL MEDICINE

## 2025-02-05 PROCEDURE — 6360000002 HC RX W HCPCS: Performed by: INTERNAL MEDICINE

## 2025-02-05 PROCEDURE — 2580000003 HC RX 258: Performed by: INTERNAL MEDICINE

## 2025-02-05 RX ORDER — FENTANYL CITRATE 50 UG/ML
INJECTION, SOLUTION INTRAMUSCULAR; INTRAVENOUS PRN
Status: DISCONTINUED | OUTPATIENT
Start: 2025-02-05 | End: 2025-02-05 | Stop reason: ALTCHOICE

## 2025-02-05 RX ORDER — MIDAZOLAM HYDROCHLORIDE 1 MG/ML
INJECTION, SOLUTION INTRAMUSCULAR; INTRAVENOUS PRN
Status: DISCONTINUED | OUTPATIENT
Start: 2025-02-05 | End: 2025-02-05 | Stop reason: ALTCHOICE

## 2025-02-05 RX ORDER — ASPIRIN 81 MG/1
81 TABLET ORAL DAILY
COMMUNITY

## 2025-02-05 RX ORDER — PSYLLIUM HUSK 0.4 G
5000 CAPSULE ORAL DAILY
COMMUNITY

## 2025-02-05 RX ORDER — ATORVASTATIN CALCIUM 10 MG/1
10 TABLET, FILM COATED ORAL DAILY
COMMUNITY

## 2025-02-05 RX ORDER — FAMOTIDINE 20 MG/1
20 TABLET, FILM COATED ORAL 2 TIMES DAILY
COMMUNITY
Start: 2024-11-22

## 2025-02-05 RX ORDER — PANTOPRAZOLE SODIUM 20 MG/1
20 TABLET, DELAYED RELEASE ORAL DAILY
COMMUNITY

## 2025-02-05 RX ORDER — MULTIVITAMIN,THERAPEUTIC
1 TABLET ORAL DAILY
COMMUNITY

## 2025-02-05 RX ORDER — METOPROLOL SUCCINATE 50 MG/1
50 TABLET, EXTENDED RELEASE ORAL DAILY
COMMUNITY

## 2025-02-05 RX ORDER — LOSARTAN POTASSIUM 50 MG/1
50 TABLET ORAL DAILY
COMMUNITY

## 2025-02-05 RX ORDER — LIDOCAINE 50 MG/G
1 PATCH TOPICAL EVERY 24 HOURS
COMMUNITY
Start: 2024-12-01

## 2025-02-05 RX ORDER — SODIUM CHLORIDE 9 MG/ML
INJECTION, SOLUTION INTRAVENOUS CONTINUOUS
Status: DISCONTINUED | OUTPATIENT
Start: 2025-02-05 | End: 2025-02-05 | Stop reason: HOSPADM

## 2025-02-05 RX ADMIN — SODIUM CHLORIDE: 9 INJECTION, SOLUTION INTRAVENOUS at 09:57

## 2025-02-05 ASSESSMENT — PAIN - FUNCTIONAL ASSESSMENT
PAIN_FUNCTIONAL_ASSESSMENT: ADULT NONVERBAL PAIN SCALE (NPVS)
PAIN_FUNCTIONAL_ASSESSMENT: NONE - DENIES PAIN
PAIN_FUNCTIONAL_ASSESSMENT: 0-10
PAIN_FUNCTIONAL_ASSESSMENT: NONE - DENIES PAIN
PAIN_FUNCTIONAL_ASSESSMENT: 0-10
PAIN_FUNCTIONAL_ASSESSMENT: NONE - DENIES PAIN
PAIN_FUNCTIONAL_ASSESSMENT: 0-10

## 2025-02-05 NOTE — PRE SEDATION
Sedation Pre-Procedure Note    Patient Name: Julita Singletary   YOB: 1960  Room/Bed: ENDO/PL  Medical Record Number: [id number]  Date: 2/5/2025   Time: 11:19 AM       Indication:  GERD    Consent: I have discussed with the patient and/or the patient representative the indication, alternatives, and the possible risks and/or complications of the planned procedure and the anesthesia methods. The patient and/or patient representative appear to understand and agree to proceed.    Vital Signs:   Vitals:    02/05/25 1115   BP: (!) 145/68   Pulse: 83   Resp: 14   Temp:    SpO2: 98%       Past Medical History:   has a past medical history of Arrhythmia, Arthritis, GERD (gastroesophageal reflux disease), Hypertension, Liver disease, and Nausea & vomiting.    Past Surgical History:   has a past surgical history that includes Hysterectomy (2006); Cholecystectomy; lumbar fusion (2002); orthopedic surgery; hernia repair (1985); back surgery (1992); Breast lumpectomy (Right); and Colonoscopy (N/A, 11/11/2019).    Medications:   Scheduled Meds:   Continuous Infusions:    sodium chloride 100 mL/hr at 02/05/25 0957     PRN Meds:   Home Meds:   Prior to Admission medications    Medication Sig Start Date End Date Taking? Authorizing Provider   pantoprazole (PROTONIX) 20 MG tablet Take 1 tablet by mouth daily   Yes Rosy Nails MD   Multiple Vitamins-Minerals (ONCOVITE) TABS Take 1 tablet by mouth daily   Yes Rosy Nails MD   metoprolol succinate (TOPROL XL) 50 MG extended release tablet Take 1 tablet by mouth daily   Yes Rosy Nails MD   losartan (COZAAR) 50 MG tablet Take 1 tablet by mouth daily   Yes Rosy Nails MD   lidocaine (LIDODERM) 5 % Place 1 patch onto the skin every 24 hours 12/1/24  Yes Rosy Nails MD   famotidine (PEPCID) 20 MG tablet Take 1 tablet by mouth 2 times daily 11/22/24  Yes Rosy Nails MD   co-enzyme Q-10 30 MG capsule Take 200 mg by mouth

## 2025-02-05 NOTE — DISCHARGE INSTRUCTIONS
Julita Singletary  [id number]  1960    EGD DISCHARGE INSTRUCTIONS  Discomfort:  Sore throat- throat lozenges or warm salt water gargle  redness at IV site- apply warm compress to area; if redness or soreness persist- contact your physician  Gaseous discomfort- walking, belching will help relieve any discomfort  You may not operate a vehicle until the next day  You may not engage in an occupation involving machinery or appliances until the next day  You may not drink alcoholic beverages until the next day  Avoid making any critical decisions for at least 24 hour    DIET   You may not resume your regular diet. Antireflux diet.    ACTIVITY  You may not resume your normal daily activities   Spend the remainder of the day resting -  avoid any strenuous activity.    CALL M.D.  ANY SIGN OF   Increasing pain, nausea, vomiting  Abdominal distension (swelling)  New increased bleeding (oral or rectal)  Fever (chills)  Pain in chest area  Bloody discharge from nose or mouth  Shortness of breath     You may not take any Advil, Aspirin, Ibuprofen, Motrin, Aleve, or Goody’s for {NUMBERS:31500} days, preferably ONLY  Tylenol as needed for pain.    Follow-up Instructions:   Follow-up in the office as scheduled or make a follow-up appointment in 2 weeks.     Pastor Aguilar MD  February 5, 2025      DISCHARGE SUMMARY from Nurse    PATIENT INSTRUCTIONS:    After general anesthesia or intravenous sedation, for 24 hours or while taking prescription Narcotics:  Limit your activities  Do not drive and operate hazardous machinery  Do not make important personal or business decisions  Do  not drink alcoholic beverages  If you have not urinated within 8 hours after discharge, please contact your surgeon on call.    Report the following to your surgeon:  Excessive pain, swelling, redness or odor of or around the surgical area  Temperature over 100.5  Nausea and vomiting lasting longer than 4 hours or if unable to take medications  Any

## 2025-02-05 NOTE — PROCEDURES
(EGD) Esophagogastroduodenoscopy (UPPER ENDOSCOPY) Procedure Note  [facility]  __________________________________________________________________________________________________________________________        2/5/2025   Date of Procedure: 2/5/2025    Patient: Julita Singletary YOB: 1960 Gender: female Age: 64 y.o.    INDICATION:  65 yo female a Pt of Gerri Patterson, FNP, GERD x 15  years treated for years for many years -Switched to Pantoprazole 20mg a few months ago (helped better)  Then began having nocturnal reflux to level of throat episodes approx. 2 weeks ago. Second episode was more severe, with abruptly awakening to SOB. No identifiable triggers noted.  Pantoprazole 40mg & Pepcid 20mg BID - Now without recurrence of night-time episodes.  Last EGD was 4/27/2009 with esophageal dilatation. She has belching and regurgitation. No N/Vx or dysphagia  s/p Cholecystectomy (2011)BMI: 34.5, BM: irregular. Q 2 days    : JOSÉ MIGUEL COSTA MD    Referring Provider:  Vanessa Rader DO    Sedation:  Versed 7 mg IV, Fentanyl 100 mcg IV    Procedure Details:  After infomed consent was obtained for the procedure, with all risks and benefits of procedure explained to the patient. He was taken to the endoscopy suite and placed in the left lateral decubitus position.  Following sequential administration of sedation as per above, the endoscope was inserted into the mouth and advanced under direct vision to third portion of the duodenum.  A careful inspection was made as the gastroscope was withdrawn, including a retroflexed view of the proximal stomach; findings and interventions are described below.      OROPHARYNX: The vocal Cords and the larynx are normal.   ESOPHAGUS: The proximal, mid, and distal oesophagus are normal. The Z-Line is irregular in one location going up by 9 mm. There is a small 1 to 4 cm sliding Hiatal hernia. Diaphragmatic opening is relatively large located at 39 cm.

## 2025-02-05 NOTE — H&P
Assessment/Plan  # Detail Type Description    1. Assessment GERD w/o esophagitis (K21.9).    Impression Pt of Gerri Patterson, LIZETTE, GERD x6 years - Recent flare-ups.  Previously on Lansoprazole for years.  Switched to Pantoprazole 20mg a few months ago (helped)  Then began having nocturnal reflux to level of throat episodes approx. 2 weeks ago. Second episode was more severe, with abruptly awakening to SOB. No identifiable triggers noted.  Pantoprazole 40mg & Pepcid 20mg BID - Now without recurrence of night-time episodes.  _______________________________  Last EGD was 4/27/2009  s/p Cholecystectomy (2011)  _______________________________  BMI: 34.5, BM: irregular.    Patient Plan *Recommendations:  -For interval recurrences, may try switching PPI dosage timing to 30 minutes prior to meals.   -May also add OTC Gaviscon before bedtime, or as needed for breakthrough symptoms.  _________________________  *EGD Plan:  Will proceed with EGD with Dr. Aguilar, to evaluate upper GI tract for abnormalities, evidence to explain symptoms (of bleeding), and Lewis's epithelium with biopsies, polypectomy, or dilation as indicated.    *EGD Risks:  Explained risks of procedure to include bleeding, infection, reaction to sedation, and perforation with possible need for admission to the hospital, and in the most extensive of  circumstances, the patient may require surgery. Pt verbalized understanding of these risks and is agreeable with this procedure.    Plan Orders UPPER GI ENDOSCOPY, DIAGNOSIS to be performed.              This 63 year old  patient was referred by Verito Patterson.  This 63 year old patient presents for GERD.    History of Present Illness  1.  GERD   The onset of the heartburn was 6 years ago.  The severity is moderate.  Duration: varies.  The problem is getting worse. The patient reports heartburn.  It occurs randomly.  Denies aggravating factors.  The symptoms are relieved by sitting up.  Associated symptoms include

## (undated) DEVICE — DRESSING FOAM SELF ADH 20X10 CM ABSORBENT MEPILEX BORDER

## (undated) DEVICE — LEGGINGS, PAIR, 31X48, STERILE: Brand: MEDLINE

## (undated) DEVICE — SHEET,DRAPE,70X85,STERILE: Brand: MEDLINE

## (undated) DEVICE — SUT MCRYL + 3-0 27IN PS1 UD --

## (undated) DEVICE — DRAPE TWL SURG 16X26IN BLU ORB04] ALLCARE INC]

## (undated) DEVICE — KENDALL RADIOLUCENT FOAM MONITORING ELECTRODE RECTANGULAR SHAPE: Brand: KENDALL

## (undated) DEVICE — Device

## (undated) DEVICE — NDL PRT INJ NSAF BLNT 18GX1.5 --

## (undated) DEVICE — KENDALL SCD EXPRESS SLEEVES, KNEE LENGTH, MEDIUM: Brand: KENDALL SCD

## (undated) DEVICE — Z DISCONTINUED USE (MFG CAT 7984-37) SOLUTION IV SODIUM CHL 0.9% 100 ML INJ

## (undated) DEVICE — CATHETER IV 22GA L1IN BLU POLYUR STR HUB RADPQ PROTCT +

## (undated) DEVICE — NDL FLTR TIP 5 MIC 18GX1.5IN --

## (undated) DEVICE — REM POLYHESIVE ADULT PATIENT RETURN ELECTRODE: Brand: VALLEYLAB

## (undated) DEVICE — (D)STRIP SKN CLSR 0.5X4IN WHT --

## (undated) DEVICE — T4 ZIPPER TOGA, (L/XL)

## (undated) DEVICE — SUT ETHBND 1 30IN OS8 GRN --

## (undated) DEVICE — SYRINGE BLB 50CC IRRIG PLIABLE FNGR FLNG GRAD FLSK DISP

## (undated) DEVICE — STERILE POLYISOPRENE POWDER-FREE SURGICAL GLOVES WITH EMOLLIENT COATING: Brand: PROTEXIS

## (undated) DEVICE — CATH SUC CTRL PRT TRIFLO 14FR --

## (undated) DEVICE — ENDO CARRY-ON PROCEDURE KIT INCLUDES ENZYMATIC SPONGE, GAUZE, BIOHAZARD LABEL, TRAY, LUBRICANT, DIRTY SCOPE LABEL, WATER LABEL, TRAY, DRAWSTRING PAD, AND DEFENDO 4-PIECE KIT.: Brand: ENDO CARRY-ON PROCEDURE KIT

## (undated) DEVICE — LINER GLV L R FULL FNGR KEVLAR LYCRA CUT RESIST PWD FREE ST

## (undated) DEVICE — BLUNTFILL: Brand: MONOJECT

## (undated) DEVICE — SYRINGE MED 50ML LUERSLIP TIP

## (undated) DEVICE — SUT VCRL + 1 36IN CT1 VIO --

## (undated) DEVICE — MOUTHPIECE ENDOSCP L CTRL OPN AND SIDE PORTS DISP

## (undated) DEVICE — TOURNIQUET PHLEB W1XL18IN BLU FLAT RL AND BND REUSE FOR IV

## (undated) DEVICE — T5 HOOD WITH PEEL AWAY FACE SHIELD

## (undated) DEVICE — SET ADMIN 16ML TBNG L100IN 2 Y INJ SITE IV PIGGY BK DISP

## (undated) DEVICE — SINGLE PORT MANIFOLD: Brand: NEPTUNE 2

## (undated) DEVICE — BLADE SAW 1.19X20X90 MM FOR LG BNE

## (undated) DEVICE — DRAIN SURG L0.75IN TRCR

## (undated) DEVICE — SYRINGE 50ML E/T

## (undated) DEVICE — CATH IV SAFE STR 22GX1IN BLU -- PROTECTIV PLUS

## (undated) DEVICE — TRNQT TEXT 1X18IN BLU LF DISP -- CONVERT TO ITEM 362165

## (undated) DEVICE — SUT VCRL + 3-0 27IN CT2 UD --

## (undated) DEVICE — SYR 3ML LL TIP 1/10ML GRAD --

## (undated) DEVICE — SUT VCRL + 0 36IN CT1 UD --

## (undated) DEVICE — MAJ-1414 SINGLE USE ADPATER BIOPSY VALV: Brand: SINGLE USE ADAPTOR BIOPSY VALVE

## (undated) DEVICE — GOWN,SIRUS,NONRNF,SETINSLV,2XL,18/CS: Brand: MEDLINE

## (undated) DEVICE — DRAIN KT WND 10FR RND 400ML --

## (undated) DEVICE — CANNULA CUSH AD W/ 14FT TBG

## (undated) DEVICE — SOLUTION IV 500ML 0.9% SOD CHL FLX CONT

## (undated) DEVICE — NEEDLE HYPO 22GA L1.5IN BLK S STL HUB POLYPR SHLD REG BVL

## (undated) DEVICE — WRISTBAND ID AD W2.5XL9.5CM RED VYN ADH CLSR UNI-PRINT

## (undated) DEVICE — GARMENT,MEDLINE,DVT,INT,CALF,MED, GEN2: Brand: MEDLINE

## (undated) DEVICE — BIPOLAR SEALER 23-301-1 AQM MBS: Brand: AQUAMANTYS™

## (undated) DEVICE — TRAP SPEC COLL POLYP POLYSTYR --

## (undated) DEVICE — SYRINGE MEDICAL 3ML CLEAR PLASTIC STANDARD NON CONTROL LUERLOCK TIP DISPOSABLE

## (undated) DEVICE — MAYO STAND COVER: Brand: CONVERTORS

## (undated) DEVICE — MEDI-VAC NON-CONDUCTIVE SUCTION TUBING: Brand: CARDINAL HEALTH

## (undated) DEVICE — SYR 5ML 1/5 GRAD LL NSAF LF --

## (undated) DEVICE — EJECTOR SALIVA 6 IN FLX CLR

## (undated) DEVICE — (D)PREP SKN CHLRAPRP APPL 26ML -- CONVERT TO ITEM 371833

## (undated) DEVICE — SPONGE GZ W4XL4IN RAYON POLY 4 PLY NONWOVEN FASTER WICKING

## (undated) DEVICE — SUT ETHLN 3-0 18IN PS1 BLK --

## (undated) DEVICE — SPONGE GZ W4XL4IN COT 12 PLY TYP VII WVN C FLD DSGN

## (undated) DEVICE — STERILE POLYISOPRENE POWDER-FREE SURGICAL GLOVES: Brand: PROTEXIS

## (undated) DEVICE — Z DISCONTINUED USE 2429233 DRESSING FOAM W10XL10CM 5 LAYR SELF ADH VERSATILE SAFETAC

## (undated) DEVICE — SYR 10ML CTRL LR LCK NSAF LF --

## (undated) DEVICE — SOL IRRIGATION INJ NACL 0.9% 500ML BTL

## (undated) DEVICE — PACK PROCEDURE SURG ANTR HIP